# Patient Record
Sex: FEMALE | Race: WHITE | Employment: STUDENT | URBAN - METROPOLITAN AREA
[De-identification: names, ages, dates, MRNs, and addresses within clinical notes are randomized per-mention and may not be internally consistent; named-entity substitution may affect disease eponyms.]

---
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
## 2025-01-14 ENCOUNTER — APPOINTMENT (OUTPATIENT)
Dept: BEHAVIORAL HEALTH | Facility: CLINIC | Age: 20
End: 2025-01-14
Payer: COMMERCIAL

## 2025-01-14 DIAGNOSIS — F39 MOOD DISORDER (CMS-HCC): ICD-10-CM

## 2025-01-14 DIAGNOSIS — F41.9 ANXIETY: ICD-10-CM

## 2025-01-14 DIAGNOSIS — F32.A DEPRESSION, UNSPECIFIED DEPRESSION TYPE: ICD-10-CM

## 2025-01-14 DIAGNOSIS — F90.0 ATTENTION DEFICIT HYPERACTIVITY DISORDER (ADHD), PREDOMINANTLY INATTENTIVE TYPE: ICD-10-CM

## 2025-01-14 DIAGNOSIS — F22 PARANOIA (MULTI): ICD-10-CM

## 2025-01-14 PROCEDURE — 90837 PSYTX W PT 60 MINUTES: CPT

## 2025-01-14 NOTE — PROGRESS NOTES
Therapy Session  Progress Note    Session Type: Virtual    Start Time: 11:00 am  End Time: 11:55 am    Appointment: Scheduled    Reason for Visit: Depression, Anxiety, Mood Disorder, Paranoia    Patient Symptoms/Interval History: Feeling less anxious, started on a new medication which may be helping. Has been intently watching various news clips on [employer] and has fears related to what things she has learned. Identified that she has become more paranoid since the presidential election, has fears that something bad will happen. Has been staying up late at night to watch [employer] videos and admits that doing so often gets her scared and she sometimes has fears she will die. Trying to cut back on her marijuana use since being hospitalized. Plans to start taking classes this semester.    Mental Status: Alert & Oriented x3    Functional Status: No impairment    Interventions Provided: Solution-Focused Therapy, Review Progress/Goals    Progress Made: Moderate    Response to Interventions: The patient was receptive to the interventions provided.    Action Plan/Homework: The patient would like to consider what places she can apply for a job at.    Treatment Plan: Use Solution-Focused Therapy to help the patient manage symptoms of depression, anxiety and paranoia.    Follow Up/Next Appointment: Follow up in 2 weeks.      CHONG Murillo, ANGUS-S    **This visit was performed using real-time telehealth tools, including an audio/video connection between the patient and this provider. The patient provided consent for the individual telemedicine service and understands the limitations of the visit.

## 2025-01-21 ENCOUNTER — SOCIAL WORK (OUTPATIENT)
Dept: BEHAVIORAL HEALTH | Facility: CLINIC | Age: 20
End: 2025-01-21
Payer: COMMERCIAL

## 2025-01-21 DIAGNOSIS — F32.A DEPRESSION, UNSPECIFIED DEPRESSION TYPE: ICD-10-CM

## 2025-01-21 DIAGNOSIS — F39 MOOD DISORDER (CMS-HCC): ICD-10-CM

## 2025-01-21 DIAGNOSIS — F22 PARANOIA (MULTI): ICD-10-CM

## 2025-01-21 DIAGNOSIS — F90.0 ATTENTION DEFICIT HYPERACTIVITY DISORDER (ADHD), PREDOMINANTLY INATTENTIVE TYPE: ICD-10-CM

## 2025-01-21 DIAGNOSIS — F41.9 ANXIETY: ICD-10-CM

## 2025-01-21 PROCEDURE — 90837 PSYTX W PT 60 MINUTES: CPT
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
## 2025-01-31 ENCOUNTER — OFFICE VISIT (OUTPATIENT)
Dept: PRIMARY CARE | Facility: CLINIC | Age: 20
End: 2025-01-31
Payer: COMMERCIAL

## 2025-01-31 ENCOUNTER — APPOINTMENT (OUTPATIENT)
Dept: BEHAVIORAL HEALTH | Facility: CLINIC | Age: 20
End: 2025-01-31
Payer: COMMERCIAL

## 2025-01-31 VITALS
HEART RATE: 65 BPM | WEIGHT: 102 LBS | HEIGHT: 61 IN | BODY MASS INDEX: 19.26 KG/M2 | RESPIRATION RATE: 16 BRPM | SYSTOLIC BLOOD PRESSURE: 80 MMHG | TEMPERATURE: 98.1 F | OXYGEN SATURATION: 99 % | DIASTOLIC BLOOD PRESSURE: 54 MMHG

## 2025-01-31 DIAGNOSIS — Z11.1 SCREENING EXAMINATION FOR PULMONARY TUBERCULOSIS: ICD-10-CM

## 2025-01-31 DIAGNOSIS — F90.0 ATTENTION DEFICIT HYPERACTIVITY DISORDER (ADHD), PREDOMINANTLY INATTENTIVE TYPE: ICD-10-CM

## 2025-01-31 DIAGNOSIS — F22 PARANOIA (MULTI): ICD-10-CM

## 2025-01-31 DIAGNOSIS — F41.9 ANXIETY: ICD-10-CM

## 2025-01-31 DIAGNOSIS — F32.A DEPRESSION, UNSPECIFIED DEPRESSION TYPE: ICD-10-CM

## 2025-01-31 DIAGNOSIS — F39 MOOD DISORDER (CMS-HCC): ICD-10-CM

## 2025-01-31 DIAGNOSIS — Z00.00 ROUTINE GENERAL MEDICAL EXAMINATION AT A HEALTH CARE FACILITY: Primary | ICD-10-CM

## 2025-01-31 DIAGNOSIS — Z23 NEED FOR TUBERCULOSIS VACCINATION: ICD-10-CM

## 2025-01-31 PROCEDURE — 86580 TB INTRADERMAL TEST: CPT | Performed by: FAMILY MEDICINE

## 2025-01-31 PROCEDURE — 90837 PSYTX W PT 60 MINUTES: CPT

## 2025-01-31 PROCEDURE — 3008F BODY MASS INDEX DOCD: CPT | Performed by: FAMILY MEDICINE

## 2025-01-31 PROCEDURE — 99395 PREV VISIT EST AGE 18-39: CPT | Performed by: FAMILY MEDICINE

## 2025-01-31 PROCEDURE — 1036F TOBACCO NON-USER: CPT | Performed by: FAMILY MEDICINE

## 2025-01-31 ASSESSMENT — PATIENT HEALTH QUESTIONNAIRE - PHQ9
7. TROUBLE CONCENTRATING ON THINGS, SUCH AS READING THE NEWSPAPER OR WATCHING TELEVISION: NEARLY EVERY DAY
4. FEELING TIRED OR HAVING LITTLE ENERGY: NEARLY EVERY DAY
8. MOVING OR SPEAKING SO SLOWLY THAT OTHER PEOPLE COULD HAVE NOTICED. OR THE OPPOSITE, BEING SO FIGETY OR RESTLESS THAT YOU HAVE BEEN MOVING AROUND A LOT MORE THAN USUAL: NOT AT ALL
5. POOR APPETITE OR OVEREATING: NOT AT ALL
1. LITTLE INTEREST OR PLEASURE IN DOING THINGS: MORE THAN HALF THE DAYS
2. FEELING DOWN, DEPRESSED OR HOPELESS: MORE THAN HALF THE DAYS
3. TROUBLE FALLING OR STAYING ASLEEP OR SLEEPING TOO MUCH: NEARLY EVERY DAY
9. THOUGHTS THAT YOU WOULD BE BETTER OFF DEAD, OR OF HURTING YOURSELF: NOT AT ALL
SUM OF ALL RESPONSES TO PHQ QUESTIONS 1-9: 14
SUM OF ALL RESPONSES TO PHQ9 QUESTIONS 1 AND 2: 4
6. FEELING BAD ABOUT YOURSELF - OR THAT YOU ARE A FAILURE OR HAVE LET YOURSELF OR YOUR FAMILY DOWN: SEVERAL DAYS
10. IF YOU CHECKED OFF ANY PROBLEMS, HOW DIFFICULT HAVE THESE PROBLEMS MADE IT FOR YOU TO DO YOUR WORK, TAKE CARE OF THINGS AT HOME, OR GET ALONG WITH OTHER PEOPLE: VERY DIFFICULT

## 2025-01-31 NOTE — PROGRESS NOTES
"Subjective   Patient ID: Delfina Ramirez is a 19 y.o. female who presents for Annual Exam.  HPI  Annual physical   Eats a generally healthy diet   Not active   Denies any chest pain,SOB  Urination/BM normal   Last eye apt 10/2023  Last dental apt 2 month   LMP less 1 week ago  Pt did bring paper work for Physical    Pt is going need a two step TB test    No other concern/question     Review of systems  ; Patient seen today for exam denies any problems with headaches or vision, denies any shortness of breath chest pain nausea or vomiting, no black stool no blood in the stool no heartburn type symptoms denies any problems with constipation or diarrhea, and no dysuria-type symptoms    The patient's allergies medications were reviewed with them today    The patient's social family and surgical history or also reviewed here today, along with her past medical history.     Objective     Alert and active in  no acute distress  HEENT TMs clear oropharynx negative nares clear no drainage noted neck supple  With no adenopathy   Heart regular rate and rhythm without murmur and no carotid bruits  Lungs- clear to auscultation bilaterally, no wheeze or rhonchi noted  Thyroid -negative masses or nodularity  Abdomen- soft times four quadrants , bowel sounds positive no masses or organomegaly, negative tenderness guarding or rebound  Neurological exam unremarkable- DTRs in upper and lower extremities within normal limits.   skin -no lesions noted      No scoliosis    BP 80/54 (BP Location: Right arm, Patient Position: Sitting, BP Cuff Size: Small adult)   Pulse 65   Temp 36.7 °C (98.1 °F) (Temporal)   Resp 16   Ht 1.549 m (5' 1\")   Wt 46.3 kg (102 lb)   SpO2 99%   BMI 19.27 kg/m²     Allergies   Allergen Reactions    Dextroamphetamine-Amphetamine Other     Mood changes    Haldol [Haloperidol] Unknown       Assessment/Plan   Problem List Items Addressed This Visit       BMI less than 19,adult    Routine general medical " examination at a health care facility - Primary    Relevant Orders    Varicella Zoster Antibody, IgG    Measles (Rubeola) Antibody, IgG    Mumps Antibody, IgG    Rubella antibody, IgG    T-Spot TB     Other Visit Diagnoses       Need for tuberculosis vaccination        Screening examination for pulmonary tuberculosis        Relevant Orders    TB Skin Test (Completed)          We reviewed immunizations and discussed car and bike safety, and abstaining from alcohol tobacco and illicit drug use, was also discussed safe sex, reminded about good nutrition and exercise with less sweets and soda pop    If anything worsens or changes please call us at once, follow up in the office as planned,

## 2025-01-31 NOTE — PROGRESS NOTES
Therapy Session  Progress Note    Session Type: Virtual    Start Time: 11:00 am  End Time: 11:45 am    Appointment: Scheduled    Reason for Visit: Depression, Anxiety    Patient Symptoms/Interval History: Having social anxiety and anxiety related to driving. Feels isolated and frustrated. Got hired and will be starting a new job. Was supposed to start a job training program but it got cancelled for reasons unknown. Having sleep issues which affect how she feels during the day. Doesn't get along with her step-father and desean by trying to keep her mouth shut when they have a difference of opinion. Has a belief that she is not normal and she wants her life to be different. Has a goal of making friends but has difficulty taking action to leave the house to meet people.     Mental Status: Alert & Oriented x3    Functional Status: No impairment    Interventions Provided: CBT - discussed Behavioral Activation and Exposure Therapy    Progress Made: Moderate    Response to Interventions: The patient was receptive to the interventions provided.    Action Plan/Homework: The patient would like to consider if she wants to continue doing therapy since she doesn't find it helpful. She agreed to consider if this will contribute more to her social isolation.     Treatment Plan: Use CBT to help the patient gain awareness of unhelpful thought patterns and core beliefs. Help the patient learn healthy coping skills to manage depression and anxiety.     Follow Up/Next Appointment: Follow up in 2 weeks.      CHONG Murillo, SONAMS    **This visit was performed using real-time telehealth tools, including an audio/video connection between the patient and this provider. The patient provided consent for the individual telemedicine service and understands the limitations of the visit.

## 2025-02-03 LAB
IGNF NEG CNTRL BLD: NORMAL
M TB IFN-G BLD-IMP: NEGATIVE
MEV IGG SER IA-ACNC: <13.5 AU/ML
MITOGEN IGNF.SPOT COUNT BLD: NORMAL
MUV IGG SER IA-ACNC: 18.9 AU/ML
QUEST PANEL A SPOT COUNT: 0
QUEST PANEL B SPOT COUNT: 0
RUBV IGG SERPL IA-ACNC: 3.61 INDEX
VZV IGG SER IA-ACNC: 2.02 S/CO

## 2025-02-04 ENCOUNTER — TELEPHONE (OUTPATIENT)
Dept: PRIMARY CARE | Facility: CLINIC | Age: 20
End: 2025-02-04
Payer: COMMERCIAL

## 2025-02-04 NOTE — TELEPHONE ENCOUNTER
----- Message from Camilo Chaparro sent at 2/3/2025  6:05 PM EST -----  Reviewed her shots, her TB is negative she has immunity to everything except her rubella titer is decreased decreased, and her measles is not immune,,,, so the only shot she needs is 1 MMR per [place] visit to boost up her immunity levels

## 2025-02-10 ENCOUNTER — APPOINTMENT (OUTPATIENT)
Dept: BEHAVIORAL HEALTH | Facility: CLINIC | Age: 20
End: 2025-02-10
Payer: COMMERCIAL

## 2025-02-13 ENCOUNTER — APPOINTMENT (OUTPATIENT)
Dept: BEHAVIORAL HEALTH | Facility: CLINIC | Age: 20
End: 2025-02-13
Payer: COMMERCIAL

## 2025-02-13 DIAGNOSIS — F41.1 GAD (GENERALIZED ANXIETY DISORDER): ICD-10-CM

## 2025-02-13 DIAGNOSIS — F39 MOOD DISORDER (CMS-HCC): ICD-10-CM

## 2025-02-13 DIAGNOSIS — F33.2 SEVERE EPISODE OF RECURRENT MAJOR DEPRESSIVE DISORDER, WITHOUT PSYCHOTIC FEATURES (MULTI): ICD-10-CM

## 2025-02-13 DIAGNOSIS — F90.0 ATTENTION DEFICIT HYPERACTIVITY DISORDER (ADHD), PREDOMINANTLY INATTENTIVE TYPE: ICD-10-CM

## 2025-02-13 DIAGNOSIS — G47.9 SLEEP DISTURBANCE: ICD-10-CM

## 2025-02-13 DIAGNOSIS — Z91.89 COMPLIANCE WITH MEDICATION REGIMEN: ICD-10-CM

## 2025-02-13 DIAGNOSIS — F22 PARANOIA (MULTI): ICD-10-CM

## 2025-02-13 PROBLEM — E86.0 DEHYDRATION: Status: ACTIVE | Noted: 2024-11-23

## 2025-02-13 PROBLEM — R11.15 CYCLIC VOMITING SYNDROME: Status: ACTIVE | Noted: 2024-11-22

## 2025-02-13 PROBLEM — R10.84 INTRACTABLE GENERALIZED ABDOMINAL PAIN: Status: ACTIVE | Noted: 2024-11-23

## 2025-02-13 PROBLEM — F12.90 MARIJUANA USE: Status: ACTIVE | Noted: 2024-11-23

## 2025-02-13 PROBLEM — R11.2 INTRACTABLE NAUSEA AND VOMITING: Status: ACTIVE | Noted: 2024-11-23

## 2025-02-13 PROBLEM — F90.9 ADHD (ATTENTION DEFICIT HYPERACTIVITY DISORDER): Status: ACTIVE | Noted: 2025-02-13

## 2025-02-13 PROCEDURE — 99214 OFFICE O/P EST MOD 30 MIN: CPT

## 2025-02-13 RX ORDER — 1.1% SODIUM FLUORIDE PRESCRIPTION DENTAL CREAM 5 MG/G
CREAM DENTAL
COMMUNITY
Start: 2024-10-02

## 2025-02-13 RX ORDER — METOCLOPRAMIDE 5 MG/1
TABLET ORAL
COMMUNITY
Start: 2024-11-25

## 2025-02-13 RX ORDER — MIRTAZAPINE 7.5 MG/1
7.5 TABLET, FILM COATED ORAL NIGHTLY
Qty: 30 TABLET | Refills: 1 | Status: SHIPPED | OUTPATIENT
Start: 2025-02-13 | End: 2025-04-14

## 2025-02-13 RX ORDER — HYDROXYZINE HYDROCHLORIDE 25 MG/1
12.5-25 TABLET, FILM COATED ORAL DAILY PRN
Qty: 30 TABLET | Refills: 2 | Status: SHIPPED | OUTPATIENT
Start: 2025-02-13

## 2025-02-13 RX ORDER — KETOROLAC TROMETHAMINE 10 MG/1
10 TABLET, FILM COATED ORAL
COMMUNITY
Start: 2024-11-25

## 2025-02-13 RX ORDER — PROMETHAZINE HYDROCHLORIDE 25 MG/1
TABLET ORAL
COMMUNITY
Start: 2024-11-22

## 2025-02-13 RX ORDER — PALIPERIDONE 3 MG/1
3 TABLET, EXTENDED RELEASE ORAL EVERY MORNING
Qty: 30 TABLET | Refills: 1 | Status: SHIPPED | OUTPATIENT
Start: 2025-02-13 | End: 2025-04-14

## 2025-02-13 RX ORDER — METHYLPHENIDATE HYDROCHLORIDE 18 MG/1
18 TABLET ORAL EVERY MORNING
Qty: 30 TABLET | Refills: 0 | Status: SHIPPED | OUTPATIENT
Start: 2025-02-13 | End: 2025-03-15

## 2025-02-13 ASSESSMENT — ANXIETY QUESTIONNAIRES
5. BEING SO RESTLESS THAT IT IS HARD TO SIT STILL: SEVERAL DAYS
6. BECOMING EASILY ANNOYED OR IRRITABLE: SEVERAL DAYS
7. FEELING AFRAID AS IF SOMETHING AWFUL MIGHT HAPPEN: NOT AT ALL
4. TROUBLE RELAXING: SEVERAL DAYS
1. FEELING NERVOUS, ANXIOUS, OR ON EDGE: MORE THAN HALF THE DAYS
IF YOU CHECKED OFF ANY PROBLEMS ON THIS QUESTIONNAIRE, HOW DIFFICULT HAVE THESE PROBLEMS MADE IT FOR YOU TO DO YOUR WORK, TAKE CARE OF THINGS AT HOME, OR GET ALONG WITH OTHER PEOPLE: SOMEWHAT DIFFICULT
3. WORRYING TOO MUCH ABOUT DIFFERENT THINGS: SEVERAL DAYS
GAD7 TOTAL SCORE: 7
2. NOT BEING ABLE TO STOP OR CONTROL WORRYING: SEVERAL DAYS

## 2025-02-13 NOTE — PROGRESS NOTES
Delfina Ramirez is a 19 y.o. female patient presenting for a virtual FUV  Visit location: patient (Delfina, home), provider (FABRIZIO Borja, virtual office)  mom (Divya) present  Pt identify self by name, , and address     Chief Complaint   Patient presents with    Anxiety    Depression    Sleeping Problem    ADHD    Bipolar    Follow-up    Med Management    Paranoia    Panic Attack     HPI    2025  Reports significant improvement with Invega over the last several weeks.  Per mom, Invega has helped with paranoid delusions, but patient continued to experience easy anger outbursts/irritability for no reason.  Anxiety and depression have remained present but not as impactful.  Also reports mild less frequent/intense episodes of panic attacks.  Sleep however, has remained an issue, often goes to sleep at 3 AM the morning, but noting that was an improvement from having to go to sleep at 5 AM in the morning.  Appetite has been low.  Denies any noticeable side effects from medication.  Denies substance use.  Denies significant mood swings.  Continue to struggle profoundly with attention/focus/concentration.  Denies SI/hallucinations/delusions/akosua/hypomania.  Overall reports much improvement but not optimal.    Current S/Sx:  -Mood: Occasional mood swings, easy anger outburst/irritability   -Depressive mood: PHQ-9 (2)  -Fatigue/Energy: Moderate  -Feeling hope/help/worthless: denies  -Sleep: Significant difficulties falling asleep, often go to sleep between 3 and 5 AM the morning  -Motivation: average  -Appetite/Weight Changes: Poor appetite, no weight changes  -Psychosis: denies hallucinations/delusions  -SI/HI: Denies current suicidal intent/plan  -Guns/Weapons at home: denies     -Anxiety: Intermittent bouts of anxiety reported  -NADEEN-7 (7)     Panic attacks  denies     HISTORY  PSYCH HISTORY  -Psych Hx: ADHD, depression, anxiety, and mood disorder  -Psych Hospitalization Hx: Voluntary  "admission into [facility] last month. \"Called the  at 0200 to seek help because I was freaking out and they took me to the hospital.\" Was monitored for 24 hours then released unmedicated.   -Suicide Attempt Hx: denies  -Self-Harm/Violence Hx: Freshman, Sophomore, high school, but it's been a while since last cut  -Current psych meds: Concerta 18 mg daily, Hydroxyzine 25 mg daily as needed for anxiety, Valium 2 mg every 8 hours as needed for muscle spasms (given at the ER-mom stores it in her room).   -Psych Med Hx: Buspirone, Abilify 2 mg, Lithium 300 mg, several ADHD meds, on antidepressants (Prozac) - can't remember the names     SUBSTANCE USE HISTORY  -Substance Use Hx: uses marijuana every night to help sleep and anxiety  -ETOH: denies  -Tobacco: nicotine  -Caffeine: sometimes  -Substance Abuse Treatment Hx: denies     FAMILY HISTORY  -Family Psych Hx: maternal side: bipolar d/o, paternal side: depression   -Family Suicide Hx: denies  -Family Substance Abuse Hx: unsure     SOCIAL HISTORY  -Upbringing: Grew up with both parents in different households. Has 1 sister, 2 step sisters  -Income: denies financial struggle  -Safety: feels safe at home/generally  -Support system: average  -Trauma: emotional  -Education: GED  -Work: [employer]  -Marital Status: single  -Children: denies  -Living situation: Lives with mom and her boyfriend  -: denies  -Legal: denies      MEDICAL HISTORY  -PCP: Camilo Chaparro DO  -TBI/head trauma/LOC/seizure hx: denies     REVIEW OF SYSTEMS  Review of Systems   All other systems reviewed and are negative.       PHYSICAL EXAM  Physical Exam  Psychiatric:         Attention and Perception: Attention and perception normal.         Mood and Affect: Mood and affect normal.         Speech: Speech normal.         Behavior: Behavior normal. Behavior is cooperative.         Thought Content: Thought content normal.         Cognition and Memory: Cognition and memory normal.         " Judgment: Judgment normal.          IMPRESSION  Anxiety, Depression, Sleeping Problem, ADHD, Bipolar, Follow-up, Med Management, Paranoia, and Panic Attack    Delfina reports improved paranoid delusions, but easy anger outburst/irritability remain present, less impactful.  Reports bouts of anxiety, but depressive symptoms have not been as prominent.  Reports mild less intense/frequent panic attacks mostly relating to driving, finds hydroxyzine helpful.  Reports mild intermittent mood swings.  Reports ongoing difficulty with sleep an appetite.   No hallucinations/delusion/akosua/hypomania/SI reported.  No side effects or substance use, and has not had to use cannabis since last visit.  Discussed to increase Invega to further improve paranoid delusions and mood swings, add mirtazapine to help with appetite, sleep, and depression, continue to hold Wellbutrin, and resume Concerta a week from today to help with attention/focus/concentration.  Will consider Invega BENNETT to continue to stabilize mood symptoms and psychosis.  Follow-up in 4 weeks    SI/HI ASSESSMENT  -Risk Assessment: Delfina Ramirez is currently a low acute risk of suicide and self-harm due to no past suicide attempt(s) and not currently endorsing thoughts of suicide.   -Suicidal Risk Factors:  and unmarried/single  -Protective Factors: strong coping skills, positive family relationships, and employment  -Plan to Reduce Risk: increase coping skills .     PLAN  Reviewed diagnostic impression including subjective and objective data and provided education about Anxiety, MDD, bipolar disorder, ADHD, and Sleep disturbance, etiology, treatment recommendations including medication, therapy, course of treatment and prognosis. Patient amenable to treatment plan.      Dx: Anxiety, Depression, Sleeping Problem, ADHD, Bipolar, Follow-up, Med Management, Paranoia, and Panic Attack  RESUME Concerta 18 mg daily start 2/20/25  HOLD Wellbutrin  mg  daily  INCREASE Invega 3 mg daily  START Mirtazapine 7.5 mg daily       Reviewed r/b/a, possible side effects of the medication. Client is aware about the benefit outweighs the risk.     Psychotherapy: none at the moment    Labs reviewed     OARRS  I have personally reviewed the OARRS report for Delfina Ramirez. I have considered the risks of abuse, dependence, addiction and diversion. Last filled Concerta 18 mg on 08/28/2024 (30 tabs x 30 days)    Last Urine Drug Screen  Date of Last Screen: 08/21/2024, +ve cannabinoids, UDS reordered    Controlled Substance Agreement:  Date of the Last Agreement: 09/10/2024  Reviewed Controlled Substance Agreement including but not limited to the benefits, risks, and alternatives to treatment with a Controlled Substance medication(s).      -Follow-up with this provider in 4 weeks.    -Total time: 30 minutes via virtual    - Follow up with physical health providers as scheduled  -Risks/benefits/assessment of medication interventions discussed with pt; pt agreeable to plan. Will continue to monitor for symptoms mgmt and SEs and adjust plan as needed.  -MI to increase coping skills/behavior regulation.  -Safety plan reviewed.  -Call  Psychiatry at [phone] with issues.  -For [facility] residents, Mobile Crisis is a 24/7 hotline you can call for assistance at [phone]. Please call 131 or go to your closest Emergency Room if you feel worse. This includes thoughts of hurting yourself or anyone else, or having other troubles such as hearing voices, seeing visions, or having new and scary thoughts about the people around you.

## 2025-02-18 ENCOUNTER — APPOINTMENT (OUTPATIENT)
Dept: BEHAVIORAL HEALTH | Facility: CLINIC | Age: 20
End: 2025-02-18
Payer: COMMERCIAL

## 2025-02-20 DIAGNOSIS — F39 MOOD DISORDER (CMS-HCC): ICD-10-CM

## 2025-02-20 DIAGNOSIS — F22 PARANOIA (MULTI): ICD-10-CM

## 2025-02-20 DIAGNOSIS — F31.9 BIPOLAR AFFECTIVE DISORDER, REMISSION STATUS UNSPECIFIED (MULTI): ICD-10-CM

## 2025-02-20 RX ORDER — PALIPERIDONE 1.5 MG/1
TABLET, EXTENDED RELEASE ORAL
Qty: 60 TABLET | Refills: 1 | Status: SHIPPED | OUTPATIENT
Start: 2025-02-20

## 2025-02-21 NOTE — PROGRESS NOTES
"Reports tolerating Invega 1.5 mg, but has difficulty with 3 mg.  Patient and mom made aware that Invega is traditionally not meant to be split because it is an ER medication, but due to patient's specific circumstances, will administer 1.5 tabs (2.25 mg) x 1 week, then attempt to increase dose again to 3 mg daily.  Please take dose for 1 to 2 weeks before attending to add mirtazapine to regimen.  \"Consider for right now we will consider during next visit.  "

## 2025-03-10 ENCOUNTER — APPOINTMENT (OUTPATIENT)
Dept: BEHAVIORAL HEALTH | Facility: CLINIC | Age: 20
End: 2025-03-10
Payer: COMMERCIAL

## 2025-03-10 VITALS
SYSTOLIC BLOOD PRESSURE: 100 MMHG | BODY MASS INDEX: 20.52 KG/M2 | WEIGHT: 108.6 LBS | DIASTOLIC BLOOD PRESSURE: 59 MMHG | TEMPERATURE: 99.2 F | HEART RATE: 77 BPM

## 2025-03-10 DIAGNOSIS — F90.0 ATTENTION DEFICIT HYPERACTIVITY DISORDER (ADHD), PREDOMINANTLY INATTENTIVE TYPE: ICD-10-CM

## 2025-03-10 DIAGNOSIS — F31.9 BIPOLAR AFFECTIVE DISORDER, REMISSION STATUS UNSPECIFIED (MULTI): ICD-10-CM

## 2025-03-10 DIAGNOSIS — F22 PARANOIA (MULTI): ICD-10-CM

## 2025-03-10 DIAGNOSIS — F39 MOOD DISORDER (CMS-HCC): ICD-10-CM

## 2025-03-10 DIAGNOSIS — F41.1 GAD (GENERALIZED ANXIETY DISORDER): ICD-10-CM

## 2025-03-10 DIAGNOSIS — F33.2 SEVERE EPISODE OF RECURRENT MAJOR DEPRESSIVE DISORDER, WITHOUT PSYCHOTIC FEATURES (MULTI): ICD-10-CM

## 2025-03-10 DIAGNOSIS — Z91.89 COMPLIANCE WITH MEDICATION REGIMEN: ICD-10-CM

## 2025-03-10 DIAGNOSIS — G47.9 SLEEP DISTURBANCE: ICD-10-CM

## 2025-03-10 PROCEDURE — 99214 OFFICE O/P EST MOD 30 MIN: CPT

## 2025-03-10 RX ORDER — MIRTAZAPINE 15 MG/1
15 TABLET, FILM COATED ORAL NIGHTLY
Qty: 30 TABLET | Refills: 1 | Status: SHIPPED | OUTPATIENT
Start: 2025-03-10 | End: 2025-05-09

## 2025-03-10 RX ORDER — PALIPERIDONE 1.5 MG/1
TABLET, EXTENDED RELEASE ORAL
Qty: 60 TABLET | Refills: 1 | Status: SHIPPED | OUTPATIENT
Start: 2025-03-10

## 2025-03-10 ASSESSMENT — ANXIETY QUESTIONNAIRES
4. TROUBLE RELAXING: SEVERAL DAYS
7. FEELING AFRAID AS IF SOMETHING AWFUL MIGHT HAPPEN: NOT AT ALL
3. WORRYING TOO MUCH ABOUT DIFFERENT THINGS: SEVERAL DAYS
IF YOU CHECKED OFF ANY PROBLEMS ON THIS QUESTIONNAIRE, HOW DIFFICULT HAVE THESE PROBLEMS MADE IT FOR YOU TO DO YOUR WORK, TAKE CARE OF THINGS AT HOME, OR GET ALONG WITH OTHER PEOPLE: SOMEWHAT DIFFICULT
6. BECOMING EASILY ANNOYED OR IRRITABLE: NOT AT ALL
5. BEING SO RESTLESS THAT IT IS HARD TO SIT STILL: NOT AT ALL
2. NOT BEING ABLE TO STOP OR CONTROL WORRYING: SEVERAL DAYS
1. FEELING NERVOUS, ANXIOUS, OR ON EDGE: SEVERAL DAYS
GAD7 TOTAL SCORE: 4

## 2025-03-10 NOTE — PROGRESS NOTES
Delfina Ramirez is a 19 y.o. female patient presenting for aN in person FUV  Visit location: patient (Delfina, home), provider (FABRIZIO Borja, virtual office)  mom (Divya) present  Pt identify self by name, , and address     Chief Complaint   Patient presents with    Bipolar    Schizoaffective Disorder    Anxiety    Depression    Sleeping Problem    Panic Attack    Follow-up    Med Management    ADHD     [facility]    3/10/2025  Reports mostly manage mood symptoms and paranoia on current dose of Invega, but symptoms are not optimal.  Reports mild easy anger outburst/irritability but they are not as debilitating as before.  Reports feeling mostly functional on current regimen.  Continue to take mirtazapine as before, appetite is slowly improving, able to get to sleep an hour earlier than before, but still going to bed around 1 AM in the morning instead of 2 AM, also reports some improvement with depressive symptoms.  Denies excessive/debilitating worrying, reports movement of apprehension/feeling overwhelmed, but denies panic attacks.  Mood swings have mostly been managed.  Denies any noticeable side effects from medication.  Denies substance use including cannabis.  Reports poor attention/focus/concentration, hoping to start Concerta soon.  Do not increase Invega as previously discussed from 1.5 to 3 mg because of the anxiety may cause inadvertent adverse effects.  Denies SI/hallucinations/delusions/akosua/hypomania.    Current S/Sx:  -Mood: Occasional mood swings, easy anger outburst/irritability remains present  -Depressive mood: PHQ-9 (2)  -Fatigue/Energy: Moderate  -Feeling hope/help/worthless: denies  -Sleep: Significant difficulties remain present, but improving with current dose of mirtazapine  -Motivation: average  -Appetite/Weight Changes: Improved appetite, no weight changes  -Psychosis: denies hallucinations/delusions  -SI/HI: Denies current suicidal intent/plan  -Guns/Weapons at home:  "denies     -Anxiety: Reports intermittent moments of feeling overwhelmed/apprehensive  -NADEEN-7 (4)     Panic attacks  denies     HISTORY  PSYCH HISTORY  -Psych Hx: ADHD, depression, anxiety, and mood disorder  -Psych Hospitalization Hx: Voluntary admission into [facility] last month. \"Called the  at 0200 to seek help because I was freaking out and they took me to the hospital.\" Was monitored for 24 hours then released unmedicated.   -Suicide Attempt Hx: denies  -Self-Harm/Violence Hx: Freshman, Sophomore, high school, but it's been a while since last cut  -Current psych meds: Concerta 18 mg daily, Hydroxyzine 25 mg daily as needed for anxiety, Valium 2 mg every 8 hours as needed for muscle spasms (given at the ER-mom stores it in her room).   -Psych Med Hx: Buspirone, Abilify 2 mg, Lithium 300 mg, several ADHD meds, on antidepressants (Prozac) - can't remember the names     SUBSTANCE USE HISTORY  -Substance Use Hx: uses marijuana every night to help sleep and anxiety  -ETOH: denies  -Tobacco: nicotine  -Caffeine: sometimes  -Substance Abuse Treatment Hx: denies     FAMILY HISTORY  -Family Psych Hx: maternal side: bipolar d/o, paternal side: depression   -Family Suicide Hx: denies  -Family Substance Abuse Hx: unsure     SOCIAL HISTORY  -Upbringing: Grew up with both parents in different households. Has 1 sister, 2 step sisters  -Income: denies financial struggle  -Safety: feels safe at home/generally  -Support system: average  -Trauma: emotional  -Education: GED  -Work: [employer]  -Marital Status: single  -Children: denies  -Living situation: Lives with mom and her boyfriend  -: denies  -Legal: denies      MEDICAL HISTORY  -PCP: Camilo Chaparro, DO  -TBI/head trauma/LOC/seizure hx: denies     REVIEW OF SYSTEMS  Review of Systems   All other systems reviewed and are negative.       PHYSICAL EXAM  Physical Exam  Psychiatric:         Attention and Perception: Attention and perception normal.         Mood and " Affect: Mood and affect normal.         Speech: Speech normal.         Behavior: Behavior normal. Behavior is cooperative.         Thought Content: Thought content normal.         Cognition and Memory: Cognition and memory normal.         Judgment: Judgment normal.          IMPRESSION  FUV today via in person. Patient mostly manage paranoia/delusions/mood symptoms but symptoms are not optimal.  Denies debilitating mood swings/akosua/hypomania.  Continue to struggle with mild symptoms of easy anger outburst/irritability.  Also struggles with moments of apprehension/feeling overwhelmed, but denies panic attacks.  Denies debilitating depression/anxiety.  Sleep disturbance is slowly improving on current dose of mirtazapine.  Appetite is also improving.  Denies SI/hallucinations/delusions/akosua/hypomania.  Denies any noticeable side effects from medication.  Did not increase Invega dose as previously discussed, continue to take 1.5 mg.  Denies substance use including cannabis.  Reports ongoing difficulty with attention/focus/concentration.  Discussed to continue current dose of Invega, increase mirtazapine 15 mg to effectively manage sleep and appetite and depression, resume Concerta as previously discussed for attention/focus/concentration.  CSA active, reordered UDS given previous positive cannabis and urine screen, will resume Concerta following expected results.  Will follow up with this provider in 4 weeks to continue monitoring, or sooner if needed.      Plan:     1. Reviewed diagnostic impression including subjective and objective data and provided education about Anxiety, MDD, bipolar disorder, ADHD, and Sleep disturbance, paranoia, etiology, treatment recommendations including medication, therapy, course of treatment and prognosis. Patient amenable to treatment plan.     2. Safety Assessment: History of no thoughts, but denies current thoughts of SI, plan/intent.  No h/o SA. RF include  and  unmarried/single. PF include strong coping skills, positive family relationships, and employment, engaged in care, future oriented, no guns.  Low imminent risk     3. @  Problem List Items Addressed This Visit       Depression    Relevant Medications    mirtazapine (Remeron) 15 mg tablet    Mood disorder (CMS-HCC)    Relevant Medications    paliperidone (Invega) 1.5 mg 24 hr tablet    ADHD (attention deficit hyperactivity disorder)    Relevant Orders    Drug Screen, Urine With Reflex to Confirmation     Other Visit Diagnoses       NADEEN (generalized anxiety disorder)        Paranoia (Multi)        Relevant Medications    paliperidone (Invega) 1.5 mg 24 hr tablet    Compliance with medication regimen        Relevant Orders    Drug Screen, Urine With Reflex to Confirmation    Bipolar affective disorder, remission status unspecified (Multi)        Relevant Medications    paliperidone (Invega) 1.5 mg 24 hr tablet    Sleep disturbance        Relevant Medications    mirtazapine (Remeron) 15 mg tablet           RESUME Concerta 18 mg daily start 2/20/25  CONTINUE Invega 1.5 mg daily  INCREASE Mirtazapine 15 mg daily  CONTINUE hydroxyzine 25 mg, take 0.5-1 tabs daily as needed for severe anxiety/panic attacks     Reviewed r/b/a, possible side effects of the medication. Client is aware about the benefit outweighs the risk.     4. INDIVIDUAL THERAPY:  none at the moment     5. OARRS: I have personally reviewed the OARRS report for Delfina Ramirez. I have considered the risks of abuse, dependence, addiction and diversion. Last filled Concerta 18 mg on 10/28/2024  (30 tabs x 30 days)     6. Labs reviewed    7. Last Urine Drug Screen: Date of Last Screen: 08/21/2024, +ve cannabinoids, UDS reordered    8. Controlled Substance Agreement: Date of the Last Agreement: 09/10/2024  Reviewed Controlled Substance Agreement including but not limited to the benefits, risks, and alternatives to treatment with a Controlled Substance  medication(s).    9. Follow-up with this provider in 4 weeks.    10. -Total time: 22 minutes via in person     - Follow up with physical health providers as scheduled  - May follow up sooner if experiences worsening symptoms by calling  Psychiatry at [phone]  - Patient verbalized an understanding to call [facility] at [phone] ([facility]), 211, or 949/go to the nearest emergency room if experiences thoughts of harm to self or others.

## 2025-03-13 ENCOUNTER — APPOINTMENT (OUTPATIENT)
Dept: BEHAVIORAL HEALTH | Facility: CLINIC | Age: 20
End: 2025-03-13
Payer: COMMERCIAL

## 2025-03-13 NOTE — PROGRESS NOTES
"Subjective   Patient ID: Delfina Ramirez is a 19 y.o. female who presents for Earache.  HPI    Patient presents in office for ear pain. Located in both ears. Mild in severity. Ongoing x about a month. Patient states when she gets cold chills, her ears often hurt, feels like a mild aching pain. Pain 2/10. Denies any runny nose. Does not take any allergy medications.    She requested prescription for MMR vaccine.    She works at a dog shop.       Review of systems  ; Patient seen today for exam denies any problems with headaches or vision, denies any shortness of breath chest pain nausea or vomiting, no black stool no blood in the stool no heartburn type symptoms denies any problems with constipation or diarrhea, and no dysuria-type symptoms    The patient's allergies medications were reviewed with them today    The patient's social family and surgical history or also reviewed here today, along with her past medical history.     Objective     Alert and active in  no acute distress  HEENT TMs positive fluid oropharynx negative nares clear no drainage noted neck supple  With no adenopathy   Heart regular rate and rhythm without murmur and no carotid bruits  Lungs- clear to auscultation bilaterally, no wheeze or rhonchi noted  Thyroid -negative masses or nodularity  Abdomen- soft times four quadrants , bowel sounds positive no masses or organomegaly, negative tenderness guarding or rebound        BP 90/60 (BP Location: Right arm, Patient Position: Sitting, BP Cuff Size: Adult)   Pulse 77   Temp 36.6 °C (97.8 °F) (Temporal)   Ht 1.549 m (5' 1\")   SpO2 97%   BMI 20.52 kg/m²     Allergies   Allergen Reactions    Dextroamphetamine-Amphetamine Other     Mood changes    Haldol [Haloperidol] Unknown       Assessment/Plan   Problem List Items Addressed This Visit    None  Visit Diagnoses       Acute ear pain, bilateral    -  Primary    Relevant Medications    fluticasone (Flonase) 50 mcg/actuation nasal spray    montelukast " (Singulair) 10 mg tablet    BMI 20.0-20.9, adult        Bilateral acute serous otitis media, recurrence not specified        Relevant Medications    fluticasone (Flonase) 50 mcg/actuation nasal spray    montelukast (Singulair) 10 mg tablet    Need for measles-mumps-rubella (MMR) vaccine        Relevant Orders    MMR vaccine, subcutaneous (MMR II) (Completed)            Prescribed Singulair 10 mg today for her allergy issues.  Prescribed Flonase nasal spray today for decongestion.    We reviewed immunizations.  Prescribed for MMR II vaccine was sent to drug store.    If anything worsens or changes please call us at once, follow up in the office as planned,       Scribe Attestation  By signing my name below, I, Emily Fermin, Scrjay   attest that this documentation has been prepared under the direction and in the presence of Camilo Chaparro DO.

## 2025-03-14 ENCOUNTER — OFFICE VISIT (OUTPATIENT)
Dept: PRIMARY CARE | Facility: CLINIC | Age: 20
End: 2025-03-14
Payer: COMMERCIAL

## 2025-03-14 VITALS
BODY MASS INDEX: 20.52 KG/M2 | OXYGEN SATURATION: 97 % | TEMPERATURE: 97.8 F | SYSTOLIC BLOOD PRESSURE: 90 MMHG | DIASTOLIC BLOOD PRESSURE: 60 MMHG | HEIGHT: 61 IN | HEART RATE: 77 BPM

## 2025-03-14 DIAGNOSIS — H92.03 ACUTE EAR PAIN, BILATERAL: Primary | ICD-10-CM

## 2025-03-14 DIAGNOSIS — H65.03 BILATERAL ACUTE SEROUS OTITIS MEDIA, RECURRENCE NOT SPECIFIED: ICD-10-CM

## 2025-03-14 DIAGNOSIS — Z23 NEED FOR MEASLES-MUMPS-RUBELLA (MMR) VACCINE: ICD-10-CM

## 2025-03-14 PROCEDURE — 99213 OFFICE O/P EST LOW 20 MIN: CPT | Performed by: FAMILY MEDICINE

## 2025-03-14 PROCEDURE — 90707 MMR VACCINE SC: CPT | Performed by: FAMILY MEDICINE

## 2025-03-14 PROCEDURE — 90471 IMMUNIZATION ADMIN: CPT | Performed by: FAMILY MEDICINE

## 2025-03-14 PROCEDURE — 1036F TOBACCO NON-USER: CPT | Performed by: FAMILY MEDICINE

## 2025-03-14 RX ORDER — MONTELUKAST SODIUM 10 MG/1
10 TABLET ORAL NIGHTLY
Qty: 30 TABLET | Refills: 5 | Status: SHIPPED | OUTPATIENT
Start: 2025-03-14 | End: 2025-09-10

## 2025-03-14 RX ORDER — FLUTICASONE PROPIONATE 50 MCG
2 SPRAY, SUSPENSION (ML) NASAL DAILY
Qty: 16 G | Refills: 11 | Status: SHIPPED | OUTPATIENT
Start: 2025-03-14 | End: 2026-03-14

## 2025-03-14 ASSESSMENT — PATIENT HEALTH QUESTIONNAIRE - PHQ9
2. FEELING DOWN, DEPRESSED OR HOPELESS: NOT AT ALL
SUM OF ALL RESPONSES TO PHQ9 QUESTIONS 1 AND 2: 0
2. FEELING DOWN, DEPRESSED OR HOPELESS: SEVERAL DAYS
1. LITTLE INTEREST OR PLEASURE IN DOING THINGS: SEVERAL DAYS
10. IF YOU CHECKED OFF ANY PROBLEMS, HOW DIFFICULT HAVE THESE PROBLEMS MADE IT FOR YOU TO DO YOUR WORK, TAKE CARE OF THINGS AT HOME, OR GET ALONG WITH OTHER PEOPLE: NOT DIFFICULT AT ALL
1. LITTLE INTEREST OR PLEASURE IN DOING THINGS: NOT AT ALL
SUM OF ALL RESPONSES TO PHQ9 QUESTIONS 1 AND 2: 2

## 2025-03-25 ENCOUNTER — SOCIAL WORK (OUTPATIENT)
Dept: BEHAVIORAL HEALTH | Facility: CLINIC | Age: 20
End: 2025-03-25
Payer: COMMERCIAL

## 2025-03-25 DIAGNOSIS — F41.1 GAD (GENERALIZED ANXIETY DISORDER): ICD-10-CM

## 2025-03-25 DIAGNOSIS — F90.0 ATTENTION DEFICIT HYPERACTIVITY DISORDER (ADHD), PREDOMINANTLY INATTENTIVE TYPE: ICD-10-CM

## 2025-03-25 DIAGNOSIS — F39 MOOD DISORDER (CMS-HCC): ICD-10-CM

## 2025-03-25 DIAGNOSIS — F22 PARANOIA (MULTI): ICD-10-CM

## 2025-03-25 PROCEDURE — 90837 PSYTX W PT 60 MINUTES: CPT

## 2025-03-25 NOTE — PROGRESS NOTES
"Therapy Session  Progress Note    Session Type: In Office    Start Time: 2:00 pm  End Time: 2:55 pm    Appointment: Scheduled    Reason for Visit: Anxiety, Bipolar Disorder, ADHD    Patient Symptoms/Interval History: Mood has been more stable, finding the medications to be helpful. Went to a concert in [place] with her mother which she enjoyed. Admits to having \"freak outs\" when things don't go her way. Doesn't always feel like she has control over these episodes, and is rigid in her beliefs about how things should be. Started a new job that she enjoys but has certain concerns since she has to work alone during her shift. Feels anxiety about talking with her boss about her concerns but is willing to try. The patient is aware that this therapist will be leaving and that this is the final session with this therapist.     Mental Status: Alert & Oriented x3    Functional Status: No impairment    Interventions Provided: Solution-Focused Therapy, Communication Training, Referrals    Progress Made: Moderate    Response to Interventions: The patient was receptive to the interventions provided.    Action Plan/Homework: This is the final session with this therapist. The patient was open to having a referral made to the Patient Navigator to help her get linked with a new therapist.    Treatment Plan: N/A    Follow Up/Next Appointment: N/A      CHONG Murillo, BHARGAVI    "

## 2025-04-07 ENCOUNTER — APPOINTMENT (OUTPATIENT)
Dept: BEHAVIORAL HEALTH | Facility: CLINIC | Age: 20
End: 2025-04-07
Payer: COMMERCIAL

## 2025-04-07 DIAGNOSIS — F39 MOOD DISORDER (CMS-HCC): ICD-10-CM

## 2025-04-07 DIAGNOSIS — G47.9 SLEEP DISTURBANCE: ICD-10-CM

## 2025-04-07 DIAGNOSIS — F31.9 BIPOLAR AFFECTIVE DISORDER, REMISSION STATUS UNSPECIFIED (MULTI): ICD-10-CM

## 2025-04-07 DIAGNOSIS — F33.2 SEVERE EPISODE OF RECURRENT MAJOR DEPRESSIVE DISORDER, WITHOUT PSYCHOTIC FEATURES (MULTI): ICD-10-CM

## 2025-04-07 DIAGNOSIS — F22 PARANOIA (MULTI): ICD-10-CM

## 2025-04-07 PROCEDURE — 99214 OFFICE O/P EST MOD 30 MIN: CPT

## 2025-04-07 RX ORDER — PALIPERIDONE 1.5 MG/1
3 TABLET, EXTENDED RELEASE ORAL EVERY MORNING
Qty: 60 TABLET | Refills: 1 | Status: SHIPPED | OUTPATIENT
Start: 2025-04-07

## 2025-04-07 NOTE — PROGRESS NOTES
"Delfina Ramirez is a 20 y.o. female patient presenting for a(an) virtual FUV  Visit location: patient (Delfina Ramirez, home), [facility]  Pt identify self by name, , and address    Chief Complaint   Patient presents with    mood disorder    Bipolar    Depression    ADHD    Paranoia    Follow-up    Memory Loss     HPI    2025  Reports significant improvement with anger outburst/irritability on current dose of Invega 1.5 mg daily.  States \"after eating a lot nonstop, mom took me off the sleep medication.  I continue to eat nonstop and go to food quickly.  Sleep between midnight and 10 AM the morning.\"  Uses hydroxyzine rarely, denies panic attacks or mood swings.  States mom would like to try 3 mg of Invega for paranoia and anger outburst/irritability.  Other than increased appetite, denies oral side effects from medication.  Denies substance use.  Did not complete UDS as previously ordered, as result, has not started methylphenidate 27 mg daily.  Denies SI/hallucinations/delusions/akosua/hypomania.    Current S/Sx:  -Mood: Occasional mood swings, easy anger outburst/irritability remains present  -Depressive mood: PHQ-9 (2)  -Fatigue/Energy: Moderate  -Feeling hope/help/worthless: denies  -Sleep: Significant difficulties remain present, but improving with current dose of mirtazapine  -Motivation: average  -Appetite/Weight Changes: Improved appetite, no weight changes  -Psychosis: denies hallucinations/delusions  -SI/HI: Denies current suicidal intent/plan  -Guns/Weapons at home: denies     -Anxiety: Reports intermittent moments of feeling overwhelmed/apprehensive  -NADEEN-7 (4)     Panic attacks  denies     HISTORY  PSYCH HISTORY  -Psych Hx: ADHD, depression, anxiety, and mood disorder  -Psych Hospitalization Hx: Voluntary admission into [facility] last month. \"Called the  at 0200 to seek help because I was freaking out and they took me to the hospital.\" Was " monitored for 24 hours then released unmedicated.   -Suicide Attempt Hx: denies  -Self-Harm/Violence Hx: Freshman, Sophomore, high school, but it's been a while since last cut  -Current psych meds: Concerta 18 mg daily, Hydroxyzine 25 mg daily as needed for anxiety, Valium 2 mg every 8 hours as needed for muscle spasms (given at the ER-mom stores it in her room).   -Psych Med Hx: Buspirone, Abilify 2 mg, Lithium 300 mg, several ADHD meds, on antidepressants (Prozac) - can't remember the names     SUBSTANCE USE HISTORY  -Substance Use Hx: uses marijuana every night to help sleep and anxiety  -ETOH: denies  -Tobacco: nicotine  -Caffeine: sometimes  -Substance Abuse Treatment Hx: denies     FAMILY HISTORY  -Family Psych Hx: maternal side: bipolar d/o, paternal side: depression   -Family Suicide Hx: denies  -Family Substance Abuse Hx: unsure     SOCIAL HISTORY  -Upbringing: Grew up with both parents in different households. Has 1 sister, 2 step sisters  -Income: denies financial struggle  -Safety: feels safe at home/generally  -Support system: average  -Trauma: emotional  -Education: GED  -Work: [employer]  -Marital Status: single  -Children: denies  -Living situation: Lives with mom and her boyfriend  -: denies  -Legal: denies      MEDICAL HISTORY  -PCP: Camilo Chaparro DO  -TBI/head trauma/LOC/seizure hx: denies     REVIEW OF SYSTEMS  Review of Systems   All other systems reviewed and are negative.       PHYSICAL EXAM  Physical Exam  Psychiatric:         Attention and Perception: Attention and perception normal.         Mood and Affect: Mood and affect normal.         Speech: Speech normal.         Behavior: Behavior normal. Behavior is cooperative.         Thought Content: Thought content normal.         Cognition and Memory: Cognition and memory normal.         Judgment: Judgment normal.          IMPRESSION  FUV today via in person. Patient reports managed paranoia/delusions/mood symptoms but symptoms are  not optimal, as a result, mom would like to try increasing Invega to 3 mg/day.  Patient notes that mom held mirtazapine 15 mg at bedtime due to significant increase in appetite and she seems to be sleeping about 10 hours a day without the medication.  Has not needed hydroxyzine due to no panic attacks or severe anxiety.   Denies debilitating depression/anxiety.   Denies SI/hallucinations/delusions/akosua/hypomania.  Denies any noticeable side effects from medication.   Denies substance use including cannabis.  Reports ongoing difficulty with attention/focus/concentration, still to complete UDS for resumption of methylphenidate 18 mg daily.  Discussed to continue current dose of Invega, hold mirtazapine 15 mg, resume Concerta as previously discussed for attention/focus/concentration pending UDS.  Will follow up with this provider in 5 weeks to continue monitoring, or sooner if needed.      Plan:     1. Reviewed diagnostic impression including subjective and objective data and provided education about Anxiety, MDD, bipolar disorder, ADHD, and Sleep disturbance, paranoia, etiology, treatment recommendations including medication, therapy, course of treatment and prognosis. Patient amenable to treatment plan.     2. Safety Assessment: History of no thoughts, but denies current thoughts of SI, plan/intent.  No h/o SA. RF include  and unmarried/single. PF include strong coping skills, positive family relationships, and employment, engaged in care, future oriented, no guns.  Low imminent risk     3. @  Problem List Items Addressed This Visit       Depression    Mood disorder (CMS-HCC)    Relevant Medications    paliperidone (Invega) 1.5 mg 24 hr tablet     Other Visit Diagnoses       Sleep disturbance        Paranoia (Multi)        Relevant Medications    paliperidone (Invega) 1.5 mg 24 hr tablet    Bipolar affective disorder, remission status unspecified (Multi)        Relevant Medications    paliperidone (Invega)  1.5 mg 24 hr tablet             RESUME Concerta 18 mg daily (Pending UDS)  INCREASE Invega 1.5 mg, take 2 tabs (3 mg) daily  HOLD Mirtazapine 15 mg daily  CONTINUE hydroxyzine 25 mg, take 0.5-1 tabs daily as needed for severe anxiety/panic attacks     Reviewed r/b/a, possible side effects of the medication. Client is aware about the benefit outweighs the risk.     4. INDIVIDUAL THERAPY:  none at the moment     5. OARRS: I have personally reviewed the OARRS report for Delfina Ramirez. I have considered the risks of abuse, dependence, addiction and diversion. Last filled Concerta 18 mg on 10/28/2024  (30 tabs x 30 days)     6. Labs reviewed    7. Last Urine Drug Screen: Date of Last Screen: 08/21/2024, +ve cannabinoids, UDS reordered    8. Controlled Substance Agreement: Date of the Last Agreement: 09/10/2024  Reviewed Controlled Substance Agreement including but not limited to the benefits, risks, and alternatives to treatment with a Controlled Substance medication(s).    9. Follow-up with this provider in 5 weeks.    10. -Total time: 21 minutes via in person     - Follow up with physical health providers as scheduled  - May follow up sooner if experiences worsening symptoms by calling  Psychiatry at [phone]  - Patient verbalized an understanding to call [facility] at [phone] ([facility]), 211, or 641/go to the nearest emergency room if experiences thoughts of harm to self or others.

## 2025-04-21 ENCOUNTER — APPOINTMENT (OUTPATIENT)
Dept: BEHAVIORAL HEALTH | Facility: CLINIC | Age: 20
End: 2025-04-21
Payer: COMMERCIAL

## 2025-04-29 ENCOUNTER — TELEPHONE (OUTPATIENT)
Dept: OTHER | Age: 20
End: 2025-04-29

## 2025-04-29 DIAGNOSIS — F22 PARANOIA (MULTI): ICD-10-CM

## 2025-04-29 DIAGNOSIS — F31.9 BIPOLAR AFFECTIVE DISORDER, REMISSION STATUS UNSPECIFIED (MULTI): ICD-10-CM

## 2025-04-29 DIAGNOSIS — F41.0 ANXIETY ATTACK: ICD-10-CM

## 2025-04-29 RX ORDER — PALIPERIDONE 1.5 MG/1
1.5 TABLET, EXTENDED RELEASE ORAL DAILY
Qty: 15 TABLET | Refills: 0 | Status: SHIPPED | OUTPATIENT
Start: 2025-04-29

## 2025-04-29 RX ORDER — DIAZEPAM 5 MG/1
5 TABLET ORAL EVERY 8 HOURS PRN
Qty: 10 TABLET | Refills: 1 | Status: SHIPPED | OUTPATIENT
Start: 2025-04-29 | End: 2026-04-29

## 2025-04-29 NOTE — PROGRESS NOTES
Per mom, patient seems to be struggling with severe unrealistic paranoid delusions of being apprehended and jailed and possibly deported for her ADHD with autistic tendencies.  Patient has been crying uncontrollably, as a result, mom has not been able to go to work.  Patient will not leave the house because of fear of being arrested.  Patient skipped a dose of her Invega on Sunday, but per mom, has taken it consistently.  Patient is not sleeping or eating adequately.  Patient seems fearful to go to the hospital as he could provide grounds to be apprehended.  Discussed to give patient an extra dose of Invega 1.5 mg in addition to the 3 mg she took earlier today, also give patient 5 mg of Valium for near panic attacks.  Encouraged to feed patient small bites of food as well as encourage adequate fluid intake like protein shakes.  Mom notes severe abdominal discomfort due to patient pressing things against her abdomen and not eating.  Will follow-up during the next visit.  Patient has been provided resources to consider going to the hospital if patient's symptoms become worse.

## 2025-04-30 ENCOUNTER — APPOINTMENT (OUTPATIENT)
Dept: NEUROLOGY | Facility: CLINIC | Age: 20
End: 2025-04-30
Payer: COMMERCIAL

## 2025-04-30 VITALS
BODY MASS INDEX: 22.28 KG/M2 | HEIGHT: 61 IN | SYSTOLIC BLOOD PRESSURE: 109 MMHG | WEIGHT: 118 LBS | DIASTOLIC BLOOD PRESSURE: 72 MMHG | HEART RATE: 89 BPM

## 2025-04-30 DIAGNOSIS — G93.9 DISORDER OF BRAIN: Primary | ICD-10-CM

## 2025-04-30 DIAGNOSIS — F22: ICD-10-CM

## 2025-04-30 DIAGNOSIS — F90.9 ATTENTION DEFICIT HYPERACTIVITY DISORDER (ADHD), UNSPECIFIED ADHD TYPE: ICD-10-CM

## 2025-04-30 DIAGNOSIS — F39 MOOD DISORDER (CMS-HCC): ICD-10-CM

## 2025-04-30 PROCEDURE — 1036F TOBACCO NON-USER: CPT | Performed by: PSYCHIATRY & NEUROLOGY

## 2025-04-30 PROCEDURE — 3008F BODY MASS INDEX DOCD: CPT | Performed by: PSYCHIATRY & NEUROLOGY

## 2025-04-30 PROCEDURE — 99204 OFFICE O/P NEW MOD 45 MIN: CPT | Performed by: PSYCHIATRY & NEUROLOGY

## 2025-04-30 ASSESSMENT — ENCOUNTER SYMPTOMS
DYSPHORIC MOOD: 1
TREMORS: 0
EYE PAIN: 0
BRUISES/BLEEDS EASILY: 0
SLEEP DISTURBANCE: 1
DIFFICULTY URINATING: 0
NECK PAIN: 0
COUGH: 0
DECREASED CONCENTRATION: 1
FATIGUE: 0
SINUS PRESSURE: 0
HEADACHES: 0
BACK PAIN: 0
AGITATION: 0
UNEXPECTED WEIGHT CHANGE: 0
FACIAL ASYMMETRY: 0
VOMITING: 0
ADENOPATHY: 0
SPEECH DIFFICULTY: 0
WEAKNESS: 0
FREQUENCY: 0
PHOTOPHOBIA: 0
ABDOMINAL PAIN: 0
FEVER: 0
SHORTNESS OF BREATH: 0
CONFUSION: 0
LIGHT-HEADEDNESS: 0
TROUBLE SWALLOWING: 0
NAUSEA: 0
HALLUCINATIONS: 0
NUMBNESS: 0
HYPERACTIVE: 0
JOINT SWELLING: 0
PALPITATIONS: 0
SEIZURES: 0
DIZZINESS: 0
ARTHRALGIAS: 0
WHEEZING: 0
NECK STIFFNESS: 0

## 2025-04-30 ASSESSMENT — PATIENT HEALTH QUESTIONNAIRE - PHQ9
SUM OF ALL RESPONSES TO PHQ9 QUESTIONS 1 & 2: 2
10. IF YOU CHECKED OFF ANY PROBLEMS, HOW DIFFICULT HAVE THESE PROBLEMS MADE IT FOR YOU TO DO YOUR WORK, TAKE CARE OF THINGS AT HOME, OR GET ALONG WITH OTHER PEOPLE: SOMEWHAT DIFFICULT
1. LITTLE INTEREST OR PLEASURE IN DOING THINGS: SEVERAL DAYS
2. FEELING DOWN, DEPRESSED OR HOPELESS: SEVERAL DAYS

## 2025-04-30 NOTE — PROGRESS NOTES
Delfina Ramirez  20 y.o.       SUBJECTIVE  Delfina is a 20-year-old young girl who was seen today for evaluation of a possible attention deficit disorder with difficulty with school at home.  She was recently diagnosed with paranoid delusional disorder and currently she is on Invega.  She was diagnosed with ADHD when she was a child and did take Concerta which did seem to help her.  Today her physical and neurological exam is normal.  I would like to continue all her other medications the way she is taking but avoid using any stimulant since it could probably precipitate her delusions and paranoia.  I have advised her to discuss that with her psychiatrist and if she is still having issues with her ADHD may consider nonstimulant like Intuniv or clonidine or Kapvay.  I have advised him to follow-up with the psychiatrist and psychologist and have not scheduled any follow-up appointment with me    As you recall, Delfina is a 20-year-old young girl who has been having difficulty with her attention span constant focusing her on the diagnostic checklist she had 9 out of 9 symptoms for inattentive and 8 out of 9 for Hyper-Tet type of ADHD    She has been having issues with delusions and she was diagnosed with paranoid delusional disorder and she was still having some symptoms and started on medicine and recently had a dose increase.    As a child in first grade she was diagnosed with ADHD and took Concerta ~8 grade which does seem to have helped her    Her birth and developmental history's were unremarkable    At present she lives with her parents and has a 22-year-old sister who has a history of depression.  There is a strong family history of bipolar on maternal side with cousin and maternal grandmother.  No history is available from dad side    No history of any smoking alcohol or substance abuse but she does take marijuana for sleep  Due to technical limitations of voice recognition and human error, this note may not  "accurately reflect the care of the patient.    Review of Systems   Constitutional:  Negative for fatigue, fever and unexpected weight change.   HENT:  Negative for dental problem, ear pain, hearing loss, sinus pressure, tinnitus and trouble swallowing.    Eyes:  Negative for photophobia, pain and visual disturbance.   Respiratory:  Negative for cough, shortness of breath and wheezing.    Cardiovascular:  Negative for chest pain, palpitations and leg swelling.   Gastrointestinal:  Negative for abdominal pain, nausea and vomiting.   Genitourinary:  Negative for difficulty urinating, enuresis and frequency.   Musculoskeletal:  Negative for arthralgias, back pain, joint swelling, neck pain and neck stiffness.   Skin:  Negative for pallor and rash.   Allergic/Immunologic: Negative for food allergies.   Neurological:  Negative for dizziness, tremors, seizures, syncope, facial asymmetry, speech difficulty, weakness, light-headedness, numbness and headaches.   Hematological:  Negative for adenopathy. Does not bruise/bleed easily.   Psychiatric/Behavioral:  Positive for behavioral problems, decreased concentration, dysphoric mood and sleep disturbance. Negative for agitation, confusion and hallucinations. The patient is not hyperactive.         Problem List[1]  Medical History[2]  Surgical History[3]    reports that she has quit smoking. Her smoking use included cigarettes. She has been exposed to tobacco smoke. She has never used smokeless tobacco. She reports that she does not currently use alcohol. She reports current drug use. Frequency: 7.00 times per week. Drug: Marijuana.    /72 (BP Location: Right arm, Patient Position: Sitting, BP Cuff Size: Small adult)   Pulse 89   Ht 1.549 m (5' 1\")   Wt 53.5 kg (118 lb)   BMI 22.30 kg/m²     OBJECTIVE  Physical Exam/Neurological Exam   Constitutional: General appearance: no acute distress   Auscultation of Heart: Regular rate and rhythm, no murmurs, normal S1 and S2. "   Carotid Arteries: Intact without any bruits.   Neck is supple.   No lymph adenopathy.   Peripheral Vascular Exam: Pulses +2 and equal in all extremities. No swelling, varicosities, edema or tenderness to palpations.    Abdomen is soft, nondistended. No organomegaly.  Mental status: The patient was in no distress, alert, interactive and cooperative. Affect is appropriate.   Orientation: oriented to person, oriented to place and oriented to time.   Memory: recent memory intact and remote memory intact.   Attention: normal attention span and normal concentrating ability.   Language: normal comprehension and no difficulty naming common objects.   Fund of knowledge: Patient displays adequate knowledge of current events, adequate fund of knowledge regarding past history and adequate fund of knowledge regarding vocabulary.   Eyes: The ophthalmoscopic examination was normal. The fundi are visualized with normal disc margins and without.  Cranial nerve II: Visual fields full to confrontation.   Cranial nerves III, IV, and VI: Pupils round, equally reactive to light; no ptosis. EOMs intact. No nystagmus.   Cranial Nerve V: Facial sensation intact bilaterally.   Cranial nerve VII: Normal and symmetric facial strength.   Cranial nerve VIII: Hearing is intact bilaterally to finger rub / whisper.   Cranial nerves IX and X: Palate elevates symmetrically.   Cranial nerve XI: Shoulder shrug and neck rotation strength are intact.   Cranial nerve XII: Tongue midline with normal strength.   Motor: Motor exam was normal. Muscle bulk was normal in both upper and lower extremities. Muscle tone was normal in both upper and lower extremities. Muscle strength was 5/5 throughout. no abnormal or adventitious movements were present.   Deep Tendon Reflexes: left biceps 2+ , right biceps 2+, left triceps 2+, right triceps 2+, left brachioradialis 2+, right brachioradialis 2+, left patella 2+, right patella 2+, left ankle jerk 2+, right ankle  jerk 2+   Plantar Reflex: Toes downgoing to plantar stimulation on the left. Toes downgoing to plantar stimulation on the right.   Sensory Exam: Normal to light touch.   Coordination: There is no limb dystaxia and rapid alternating movements are intact.  Gait: Gait is normal without spasticity, ataxia or bradykinesia. Stance is stable with a negative Romberg.      ASSESSMENT/PLAN  Diagnoses and all orders for this visit:  Disorder of brain  Attention deficit hyperactivity disorder (ADHD), unspecified ADHD type  Mood disorder (CMS-HCC)  Paranoid type delusional disorder (Multi)        Atul Ortiz MD  4/30/2025  4:56 PM       [1]   Patient Active Problem List  Diagnosis    Depression    Mood disorder (CMS-HCC)    Anxiety attack    History of ADHD    Depressive disorder    Transient insomnia    Anxiety    Abdominal pain, epigastric    Acute gastric ulcer without hemorrhage or perforation    Leukocytosis    BMI less than 19,adult    Skin lesions    Otalgia    Deliberate self-cutting    Attention deficit hyperactivity disorder (ADHD)    Acute right otitis media    Abdominal pain    Suicidal ideation    Mood swings    Laceration of thigh    Dysuria    Dyspepsia    Dysmenorrhea    Laceration of thigh, left    Urinary tract infection    Mild anxiety    Head pain    Headache    Right ear pain    Routine general medical examination at a health care facility    Cyclic vomiting syndrome    Dehydration    Intractable nausea and vomiting    Marijuana use    Intractable generalized abdominal pain    ADHD (attention deficit hyperactivity disorder)   [2]   Past Medical History:  Diagnosis Date    ADHD     Anxiety    [3]   Past Surgical History:  Procedure Laterality Date    OTHER SURGICAL HISTORY  12/09/2020    No history of surgery

## 2025-05-12 ENCOUNTER — APPOINTMENT (OUTPATIENT)
Dept: BEHAVIORAL HEALTH | Facility: CLINIC | Age: 20
End: 2025-05-12
Payer: COMMERCIAL

## 2025-05-12 DIAGNOSIS — G47.9 SLEEP DISTURBANCE: ICD-10-CM

## 2025-05-12 DIAGNOSIS — F22 PARANOIA (MULTI): ICD-10-CM

## 2025-05-12 DIAGNOSIS — F41.0 ANXIETY ATTACK: ICD-10-CM

## 2025-05-12 DIAGNOSIS — F90.0 ATTENTION DEFICIT HYPERACTIVITY DISORDER (ADHD), PREDOMINANTLY INATTENTIVE TYPE: ICD-10-CM

## 2025-05-12 DIAGNOSIS — F31.9 BIPOLAR AFFECTIVE DISORDER, REMISSION STATUS UNSPECIFIED (MULTI): ICD-10-CM

## 2025-05-12 PROBLEM — L98.9 SKIN LESIONS: Status: RESOLVED | Noted: 2024-06-27 | Resolved: 2025-05-12

## 2025-05-12 PROBLEM — H92.09 OTALGIA: Status: RESOLVED | Noted: 2024-06-27 | Resolved: 2025-05-12

## 2025-05-12 PROBLEM — D72.829 LEUKOCYTOSIS: Status: RESOLVED | Noted: 2024-06-10 | Resolved: 2025-05-12

## 2025-05-12 PROBLEM — R10.13 ABDOMINAL PAIN, EPIGASTRIC: Status: RESOLVED | Noted: 2024-06-10 | Resolved: 2025-05-12

## 2025-05-12 PROCEDURE — 99214 OFFICE O/P EST MOD 30 MIN: CPT

## 2025-05-12 PROCEDURE — 1036F TOBACCO NON-USER: CPT

## 2025-05-12 RX ORDER — DIAZEPAM 5 MG/1
5 TABLET ORAL DAILY PRN
Qty: 15 TABLET | Refills: 1 | Status: SHIPPED | OUTPATIENT
Start: 2025-05-12 | End: 2026-05-12

## 2025-05-12 RX ORDER — PALIPERIDONE 3 MG/1
TABLET, EXTENDED RELEASE ORAL
COMMUNITY
Start: 2025-04-16

## 2025-05-12 RX ORDER — PALIPERIDONE 1.5 MG/1
1.5 TABLET, EXTENDED RELEASE ORAL DAILY
Qty: 30 TABLET | Refills: 0 | Status: SHIPPED | OUTPATIENT
Start: 2025-05-12 | End: 2025-06-11

## 2025-05-12 NOTE — PROGRESS NOTES
"Delfina Ramirez is a 20 y.o. female patient presenting for a(an) virtual FUV  Visit location: patient (Delfina Ramirez, home), [facility] [place]  Pt identify self by name, , and address    Chief Complaint   Patient presents with    ADHD    Anxiety    Schizoaffective Disorder    Sleeping Problem    Follow-up    Med Management    Depression    Panic Attack     HPI      \"Since upping meds, I'm not going crazy.\"   Mom states \"she's still scared, can't go for long time along. Can't sleep at night unless mom is around or sister sleeping on the floor in her room.\" Mom has been slowly adjusting meds given derealization with jump of 1.5 mg at once.  Atarax doesn't help anymore.   Valium \"has been life a lifeline to bring her back, but has not taken for the last few days.\"  Denies minimal existential delusions of paranoia on current dose of Invega.  Dose was decreased from 4.5 mg back to 3 mg with intentions to attempt 3.75 mg starting today.  Denies severe mood swings or panic attacks  Reports ineffectiveness of hydroxyzine, but finds Valium very effective in controlling anxiety symptoms  Denies feeling depressed   continue to struggle with attention/focus/concentration, consulted neurology for ADHD management,-neurology recommended to follow-up with psychiatry but discouraged the use of stimulants  Denies any noticeable side effects from medications  Denies substance use    Current S/Sx:  -Mood: Occasional mood swings, easy anger outburst/irritability remains present  -Depressive mood: PHQ-9 (2)  -Fatigue/Energy: Moderate  -Feeling hope/help/worthless: denies  -Sleep: Significant difficulties remain present, but improving with current dose of mirtazapine  -Motivation: average  -Appetite/Weight Changes: Improved appetite, no weight changes  -Psychosis: denies hallucinations/delusions  -SI/HI: Denies current suicidal intent/plan  -Guns/Weapons at home: denies   " "  -Anxiety: Reports intermittent moments of feeling overwhelmed/apprehensive  -NADEEN-7 (4)     Panic attacks  denies     HISTORY  PSYCH HISTORY  -Psych Hx: ADHD, depression, anxiety, and mood disorder  -Psych Hospitalization Hx: Voluntary admission into [facility] last month. \"Called the  at 0200 to seek help because I was freaking out and they took me to the hospital.\" Was monitored for 24 hours then released unmedicated.   -Suicide Attempt Hx: denies  -Self-Harm/Violence Hx: Freshman, Sophomore, high school, but it's been a while since last cut  -Current psych meds: Concerta 18 mg daily, Hydroxyzine 25 mg daily as needed for anxiety, Valium 2 mg every 8 hours as needed for muscle spasms (given at the ER-mom stores it in her room).   -Psych Med Hx: Buspirone, Abilify 2 mg, Lithium 300 mg, several ADHD meds, on antidepressants (Prozac) - can't remember the names     SUBSTANCE USE HISTORY  -Substance Use Hx: uses marijuana every night to help sleep and anxiety  -ETOH: denies  -Tobacco: nicotine  -Caffeine: sometimes  -Substance Abuse Treatment Hx: denies     FAMILY HISTORY  -Family Psych Hx: maternal side: bipolar d/o, paternal side: depression   -Family Suicide Hx: denies  -Family Substance Abuse Hx: unsure     SOCIAL HISTORY  -Upbringing: Grew up with both parents in different households. Has 1 sister, 2 step sisters  -Income: denies financial struggle  -Safety: feels safe at home/generally  -Support system: average  -Trauma: emotional  -Education: GED  -Work: [employer]  -Marital Status: single  -Children: denies  -Living situation: Lives with mom and her boyfriend  -: denies  -Legal: denies      MEDICAL HISTORY  -PCP: Camilo Chaparro, DO  -TBI/head trauma/LOC/seizure hx: denies     REVIEW OF SYSTEMS  Review of Systems   All other systems reviewed and are negative.       PHYSICAL EXAM  Physical Exam  Psychiatric:         Attention and Perception: Attention and perception normal.         Mood and Affect: " Mood and affect normal.         Speech: Speech normal.         Behavior: Behavior normal. Behavior is cooperative.         Thought Content: Thought content normal.         Cognition and Memory: Cognition and memory normal.         Judgment: Judgment normal.          IMPRESSION  FUV today via in virtual. Patient reports improved paranoia/delusions/mood symptoms on current dose of Invega 3 mg daily with the goal to increase to 3.75 mg today.  Per mom, increasing Invega from 3 mg to 4.5 mg was a big jump, so dose had to be down titrated and slowly tapered up  (mom and patient aware that is a long-acting medication but the benefits of getting again have always the risks).  Per mom, patient continue to struggle with delusional symptoms of paranoia and requests someone to be close to her at all times especially at bedtime, so mom often stays by her at night or sister will sleep with her in the same room.  Otherwise, reports mostly manage anxiety on intermittent use of Valium, but irrigation provided about intentions only use Valium short-term due to habit-forming tendencies on this regimen, will discuss long-term anxiety medication during the next visit.  Education also provided on the benefits of long-acting injectables, will consider starting an BENNETT (Invega Sustenna 117 mg monthly) in the next 2 visits if patient's symptoms become stable on intended Invega 4.5 mg dose daily.  Denies substance use.  Denies any noticeable side effects from medication.  Denies SI/HI/AVH/akosua/hypomania, but endorses delusions.  Sleep and appetite are better.  Denies panic attacks.  Continue to struggle with focus/attention/concentration, will consider Intuniv after mood symptoms and psychotic symptoms have been stabilized given neurology recommendations.  Will continue Invega 3 to 4.5 mg daily, continue Valium as needed for elevated anxiety/panic attacks.  Will follow up with this provider in 1 weeks to continue monitoring, or sooner if  needed.      Plan:     1. Reviewed diagnostic impression including subjective and objective data and provided education about Anxiety, MDD, bipolar disorder, ADHD, and Sleep disturbance, paranoia, etiology, treatment recommendations including medication, therapy, course of treatment and prognosis. Patient amenable to treatment plan.     2. Safety Assessment: History of no thoughts, but denies current thoughts of SI, plan/intent.  No h/o SA. RF include  and unmarried/single. PF include strong coping skills, positive family relationships, and employment, engaged in care, future oriented, no guns.  Low imminent risk     3. @  Problem List Items Addressed This Visit       Anxiety attack    Relevant Medications    diazePAM (Valium) 5 mg tablet    ADHD (attention deficit hyperactivity disorder)     Other Visit Diagnoses         Paranoia (Multi)        Relevant Medications    paliperidone (Invega) 1.5 mg 24 hr tablet      Bipolar affective disorder, remission status unspecified (Multi)        Relevant Medications    paliperidone (Invega) 1.5 mg 24 hr tablet      Sleep disturbance              CONTINUE Invega 3-4.5 mg daily depending on tolerance  PENDING Invega 117 mg IM monthly  HOLD hydroxyzine 25 mg, take 0.5-1 tabs daily as needed for severe anxiety/panic attacks  CONTINUE Valium 2.5 to 5 mg daily as needed for severe anxiety/panic attacks  PENDING (anxiolytic)  PENDING Intuniv 1 mg daily - ADHD     Reviewed r/b/a, possible side effects of the medication. Client is aware about the benefit outweighs the risk.     4. INDIVIDUAL THERAPY:  none at the moment     5. OARRS: I have personally reviewed the OARRS report for Delfina Ramirez. I have considered the risks of abuse, dependence, addiction and diversion. Last filled Concerta 18 mg on 10/28/2024  (30 tabs x 30 days)     6. Labs reviewed    7. Last Urine Drug Screen: Date of Last Screen: 08/21/2024, +ve cannabinoids, UDS reordered    8. Controlled Substance  Agreement: Date of the Last Agreement: 09/10/2024  Reviewed Controlled Substance Agreement including but not limited to the benefits, risks, and alternatives to treatment with a Controlled Substance medication(s).    9. Follow-up with this provider in 5 weeks.    10. -Total time: 30 minutes via in person     - Follow up with physical health providers as scheduled  - May follow up sooner if experiences worsening symptoms by calling  Psychiatry at [phone]  - Patient verbalized an understanding to call [place] at [phone] ([facility]), [phone], or [phone]/go to the nearest emergency room if experiences thoughts of harm to self or others.

## 2025-05-19 ENCOUNTER — APPOINTMENT (OUTPATIENT)
Dept: BEHAVIORAL HEALTH | Facility: CLINIC | Age: 20
End: 2025-05-19
Payer: COMMERCIAL

## 2025-05-19 VITALS
HEART RATE: 71 BPM | DIASTOLIC BLOOD PRESSURE: 55 MMHG | BODY MASS INDEX: 22.54 KG/M2 | HEIGHT: 61 IN | RESPIRATION RATE: 20 BRPM | WEIGHT: 119.4 LBS | SYSTOLIC BLOOD PRESSURE: 92 MMHG

## 2025-05-19 DIAGNOSIS — F22 PARANOIA (MULTI): ICD-10-CM

## 2025-05-19 DIAGNOSIS — F33.2 SEVERE EPISODE OF RECURRENT MAJOR DEPRESSIVE DISORDER, WITHOUT PSYCHOTIC FEATURES (MULTI): ICD-10-CM

## 2025-05-19 DIAGNOSIS — F31.9 BIPOLAR AFFECTIVE DISORDER, REMISSION STATUS UNSPECIFIED (MULTI): ICD-10-CM

## 2025-05-19 DIAGNOSIS — F41.1 GAD (GENERALIZED ANXIETY DISORDER): ICD-10-CM

## 2025-05-19 DIAGNOSIS — F90.0 ATTENTION DEFICIT HYPERACTIVITY DISORDER (ADHD), PREDOMINANTLY INATTENTIVE TYPE: ICD-10-CM

## 2025-05-19 PROCEDURE — 99214 OFFICE O/P EST MOD 30 MIN: CPT

## 2025-05-19 PROCEDURE — 3008F BODY MASS INDEX DOCD: CPT

## 2025-05-19 RX ORDER — VENLAFAXINE HYDROCHLORIDE 37.5 MG/1
37.5 CAPSULE, EXTENDED RELEASE ORAL DAILY
Qty: 30 CAPSULE | Refills: 1 | Status: SHIPPED | OUTPATIENT
Start: 2025-05-19 | End: 2025-07-18

## 2025-05-19 NOTE — PROGRESS NOTES
"Delfina Ramirez is a 20 y.o. female patient presenting for a(an) in person FUV  Visit location: patient (Delfina Ramirez, home), [facility] [place] [facility]  Pt identify self by name, , and address    Chief Complaint   Patient presents with    Anxiety    Depression    Schizoaffective Disorder    Panic Attack    Follow-up    Manic Behavior    Paranoia     HPI    2025  Reports worsening symptoms of anxiety  Per mom, \"her 22-year-old sister sleeps on the floor in the mattress as she would not sleep in the room by herself, if her sister is not around, then I will have to sleep on the couch even though my room is just a few feet away from hers.\"  Reports improved paranoia.  Had to return to Invega 3 mg as 3.75 mg feels with too activating  Denies panic attacks, mood swings, feeling depressed  Continue to struggle with attention deficit symptoms  Denies SI/HI/AVH/delusions/akosua/hypomania-mostly managed  continue to struggle with attention/focus/concentration, consulted neurology for ADHD management,-neurology recommended to follow-up with psychiatry but discouraged the use of stimulants  Denies any noticeable side effects from medications  Denies substance use    Current S/Sx:  -Mood: Occasional mood swings, easy anger outburst/irritability remains present  -Depressive mood: PHQ-9 (2)  -Fatigue/Energy: Moderate  -Feeling hope/help/worthless: denies  -Sleep: Significant difficulties remain present, but improving with current dose of mirtazapine  -Motivation: average  -Appetite/Weight Changes: Improved appetite, no weight changes  -Psychosis: denies hallucinations/delusions  -SI/HI: Denies current suicidal intent/plan  -Guns/Weapons at home: denies     -Anxiety: Reports intermittent moments of feeling overwhelmed/apprehensive  -NADEEN-7 (4)     Panic attacks  denies     HISTORY  PSYCH HISTORY  -Psych Hx: ADHD, depression, anxiety, and mood disorder  -Psych Hospitalization Hx: " "Voluntary admission into [facility] last month. \"Called the  at 0200 to seek help because I was freaking out and they took me to the hospital.\" Was monitored for 24 hours then released unmedicated.   -Suicide Attempt Hx: denies  -Self-Harm/Violence Hx: Freshman, Sophomore, high school, but it's been a while since last cut  -Current psych meds: Concerta 18 mg daily, Hydroxyzine 25 mg daily as needed for anxiety, Valium 2 mg every 8 hours as needed for muscle spasms (given at the ER-mom stores it in her room).   -Psych Med Hx: Buspirone, Abilify 2 mg, Lithium 300 mg, several ADHD meds, on antidepressants (Prozac) - can't remember the names     SUBSTANCE USE HISTORY  -Substance Use Hx: uses marijuana every night to help sleep and anxiety  -ETOH: denies  -Tobacco: nicotine  -Caffeine: sometimes  -Substance Abuse Treatment Hx: denies     FAMILY HISTORY  -Family Psych Hx: maternal side: bipolar d/o, paternal side: depression   -Family Suicide Hx: denies  -Family Substance Abuse Hx: unsure     SOCIAL HISTORY  -Upbringing: Grew up with both parents in different households. Has 1 sister, 2 step sisters  -Income: denies financial struggle  -Safety: feels safe at home/generally  -Support system: average  -Trauma: emotional  -Education: GED  -Work: [employer]  -Marital Status: single  -Children: denies  -Living situation: Lives with mom and her boyfriend  -: denies  -Legal: denies      MEDICAL HISTORY  -PCP: Camilo Chaparro DO  -TBI/head trauma/LOC/seizure hx: denies     REVIEW OF SYSTEMS  Review of Systems   All other systems reviewed and are negative.       PHYSICAL EXAM  Physical Exam  Psychiatric:         Attention and Perception: Attention and perception normal.         Mood and Affect: Mood and affect normal.         Speech: Speech normal.         Behavior: Behavior normal. Behavior is cooperative.         Thought Content: Thought content normal.         Cognition and Memory: Cognition and memory normal.    "      Judgment: Judgment normal.          IMPRESSION  FUV today via in person. Patient reports improved paranoia/delusions/mood symptoms on current dose of Invega 3 mg daily, did not tolerate 3.75 mg, so tapered down to 3 mg 2 days ago due to over activation and feeling the way she felt on 4.5 mg.  Reports ongoing difficulties with anxiety, tries to limit Valium administration for fear of habit-forming tendencies.  Denies depression, mood swings, panic attacks.  Reports improved delusional behaviors, denies SI/HI/AVH/delusions/akosua/hypomania since last visit.  Sleep and appetite are better.  Invega was 117 mg BENNETT remains a consideration, but will hold for now until mood symptoms improve.  Denies substance use. Sleep and appetite are better.  Denies panic attacks.  Continue to struggle with focus/attention/concentration, will consider Intuniv after mood symptoms and psychotic symptoms have been stabilized given neurology recommendations.  Will start Effexor XR 37.5 mg daily, continue Invega 3 mg daily, continue Valium as needed for elevated anxiety/panic attacks.  Will follow up with this provider in 2 weeks to continue monitoring, or sooner if needed.      Plan:     1. Reviewed diagnostic impression including subjective and objective data and provided education about Anxiety, MDD, bipolar disorder, ADHD, and Sleep disturbance, paranoia, etiology, treatment recommendations including medication, therapy, course of treatment and prognosis. Patient amenable to treatment plan.     2. Safety Assessment: History of no thoughts, but denies current thoughts of SI, plan/intent.  No h/o SA. RF include  and unmarried/single. PF include strong coping skills, positive family relationships, and employment, engaged in care, future oriented, no guns.  Low imminent risk     3. @  Problem List Items Addressed This Visit       Depression    Relevant Medications    venlafaxine XR (Effexor XR) 37.5 mg 24 hr capsule    ADHD  (attention deficit hyperactivity disorder)     Other Visit Diagnoses         NADEEN (generalized anxiety disorder)        Relevant Medications    venlafaxine XR (Effexor XR) 37.5 mg 24 hr capsule      Paranoia (Multi)          Bipolar affective disorder, remission status unspecified (Multi)              CONTINUE Invega 3-4.5 mg daily depending on tolerance  PENDING Invega 117 mg IM monthly  CONTINUE Valium 2.5 to 5 mg daily as needed for severe anxiety/panic attacks  PENDING Intuniv 1 mg daily - ADHD  START Effexor XR 37.5 mg daily     Reviewed r/b/a, possible side effects of the medication. Client is aware about the benefit outweighs the risk.     4. INDIVIDUAL THERAPY:  none at the moment     5. OARRS: I have personally reviewed the OARRS report for Delfina Ramirez. I have considered the risks of abuse, dependence, addiction and diversion. Last filled Concerta 18 mg on 10/28/2024  (30 tabs x 30 days)     6. Labs reviewed    7. Last Urine Drug Screen: Date of Last Screen: 08/21/2024, +ve cannabinoids, UDS reordered    8. Controlled Substance Agreement: Date of the Last Agreement: 09/10/2024  Reviewed Controlled Substance Agreement including but not limited to the benefits, risks, and alternatives to treatment with a Controlled Substance medication(s).    9. Follow-up with this provider in 2 weeks.    10. -Total time: 25 minutes via in person     - Follow up with physical health providers as scheduled  - May follow up sooner if experiences worsening symptoms by calling  Psychiatry at [phone]  - Patient verbalized an understanding to call [place] [facility] at [phone] ([facility]), 211, or [phone]/go to the nearest emergency room if experiences thoughts of harm to self or others.

## 2025-06-04 ENCOUNTER — APPOINTMENT (OUTPATIENT)
Dept: BEHAVIORAL HEALTH | Facility: CLINIC | Age: 20
End: 2025-06-04
Payer: COMMERCIAL

## 2025-06-04 DIAGNOSIS — G47.9 SLEEP DISTURBANCE: ICD-10-CM

## 2025-06-04 DIAGNOSIS — F31.9 BIPOLAR AFFECTIVE DISORDER, REMISSION STATUS UNSPECIFIED (MULTI): ICD-10-CM

## 2025-06-04 DIAGNOSIS — F41.1 GAD (GENERALIZED ANXIETY DISORDER): ICD-10-CM

## 2025-06-04 DIAGNOSIS — F33.2 SEVERE EPISODE OF RECURRENT MAJOR DEPRESSIVE DISORDER, WITHOUT PSYCHOTIC FEATURES (MULTI): ICD-10-CM

## 2025-06-04 DIAGNOSIS — F22 PARANOIA (MULTI): ICD-10-CM

## 2025-06-04 DIAGNOSIS — F90.0 ATTENTION DEFICIT HYPERACTIVITY DISORDER (ADHD), PREDOMINANTLY INATTENTIVE TYPE: ICD-10-CM

## 2025-06-04 PROCEDURE — 99214 OFFICE O/P EST MOD 30 MIN: CPT

## 2025-06-04 RX ORDER — VENLAFAXINE HYDROCHLORIDE 37.5 MG/1
75 CAPSULE, EXTENDED RELEASE ORAL DAILY
Qty: 30 CAPSULE | Refills: 1 | Status: SHIPPED | OUTPATIENT
Start: 2025-06-04 | End: 2025-07-04

## 2025-06-04 NOTE — PROGRESS NOTES
"Delfina Ramirez is a 20 y.o. female patient presenting for a(an) in person FUV  Visit location: patient (Delfina Ramirez, home), [facility]  Pt identify self by name, , and address    Chief Complaint   Patient presents with    ADHD    Anorexia    Depression    Follow-up    Med Management    Paranoia    Bipolar    Sleeping Problem     HPI    2025  \"Everything is going pretty good, nothing fancy going on.\"   Express +ve tolerance of Effexor XR 37.5 mg without any noticeable side effects/discomfort. Reports mild improvement with anxiety since starting Effexor. Currently taking Invega 3 mg daily. Has not needed Valium since last visit.   Sleeping well, eating normally  Denies panic attacks or mood swings but endorse \"paranoia still comes and goes.\"  Continue to struggle with attention deficit symptoms especially at school, \"have the attention of a 2 yr old.\"   Denies SI/HI/AVH/akosua/hypomania-mostly managed  Denies any noticeable side effects from other medications  Denies substance use    Current S/Sx:  -Mood: Occasional mood swings, easy anger outburst/irritability remains present  -Depressive mood: PHQ-9 (2)  -Fatigue/Energy: Moderate  -Feeling hope/help/worthless: denies  -Sleep: Significant difficulties remain present, but improving with current dose of mirtazapine  -Motivation: average  -Appetite/Weight Changes: Improved appetite, no weight changes  -Psychosis: denies hallucinations/delusions  -SI/HI: Denies current suicidal intent/plan  -Guns/Weapons at home: denies     -Anxiety: Reports intermittent moments of feeling overwhelmed/apprehensive  -NADEEN-7 (4)     Panic attacks  denies     HISTORY  PSYCH HISTORY  -Psych Hx: ADHD, depression, anxiety, and mood disorder  -Psych Hospitalization Hx: Voluntary admission into [facility] last month. \"Called the  at 0200 to seek help because I was freaking out and they took me to the hospital.\" Was monitored for 24 " hours then released unmedicated.   -Suicide Attempt Hx: denies  -Self-Harm/Violence Hx: Freshman, Sophomore, high school, but it's been a while since last cut  -Current psych meds: Concerta 18 mg daily, Hydroxyzine 25 mg daily as needed for anxiety, Valium 2 mg every 8 hours as needed for muscle spasms (given at the ER-mom stores it in her room).   -Psych Med Hx: Buspirone, Abilify 2 mg, Lithium 300 mg, several ADHD meds, on antidepressants (Prozac) - can't remember the names     SUBSTANCE USE HISTORY  -Substance Use Hx: uses marijuana every night to help sleep and anxiety  -ETOH: denies  -Tobacco: nicotine  -Caffeine: sometimes  -Substance Abuse Treatment Hx: denies     FAMILY HISTORY  -Family Psych Hx: maternal side: bipolar d/o, paternal side: depression   -Family Suicide Hx: denies  -Family Substance Abuse Hx: unsure     SOCIAL HISTORY  -Upbringing: Grew up with both parents in different households. Has 1 sister, 2 step sisters  -Income: denies financial struggle  -Safety: feels safe at home/generally  -Support system: average  -Trauma: emotional  -Education: GED  -Work: [employer]  -Marital Status: single  -Children: denies  -Living situation: Lives with mom and her boyfriend  -: denies  -Legal: denies      MEDICAL HISTORY  -PCP: Camilo Chaparro DO  -TBI/head trauma/LOC/seizure hx: denies     REVIEW OF SYSTEMS  Review of Systems   All other systems reviewed and are negative.       PHYSICAL EXAM  Physical Exam  Psychiatric:         Attention and Perception: Attention and perception normal.         Mood and Affect: Mood and affect normal.         Speech: Speech normal.         Behavior: Behavior normal. Behavior is cooperative.         Thought Content: Thought content normal.         Cognition and Memory: Cognition and memory normal.         Judgment: Judgment normal.       IMPRESSION  FUV today via in person. Patient reports paranoia/delusions/mood symptoms continue to improve, but only taking Invega 3  mg daily.  Tolerating Effexor XR 37.5 mg well with no issues, so far, finds it minimally effective for anxiety but has not noticed any side effects since starting.  Otherwise, denies mood swings or panic attacks.  Denies substance use.  Denies any noticeable side effects from the current dose of Invega.  Has not needed Valium since the last visit.  Continue to struggle with attention deficit symptoms including poor focus/attention/concentration but hesitant to try thyroid medication (Intuniv 1 mg daily) as previously recommended.  Denies SI/HI/AVH/delusions/akosua/hypomania.  Sleep and appetite remain unaffected.  Discussed to increase Effexor XR 75 mg daily, continue Invega 3 mg daily and Valium as needed for elevated anxiety/panic attacks.  Will follow up with this provider in 2 weeks to continue monitoring, or sooner if needed.      Plan:     1. Reviewed diagnostic impression including subjective and objective data and provided education about Anxiety, MDD, bipolar disorder, ADHD, and Sleep disturbance, paranoia, etiology, treatment recommendations including medication, therapy, course of treatment and prognosis. Patient amenable to treatment plan.     2. Safety Assessment: History of no thoughts, but denies current thoughts of SI, plan/intent.  No h/o SA. RF include  and unmarried/single. PF include strong coping skills, positive family relationships, and employment, engaged in care, future oriented, no guns.  Low imminent risk     3. @  Problem List Items Addressed This Visit       Depression    Relevant Medications    venlafaxine XR (Effexor XR) 37.5 mg 24 hr capsule    ADHD (attention deficit hyperactivity disorder)     Other Visit Diagnoses         NADEEN (generalized anxiety disorder)        Relevant Medications    venlafaxine XR (Effexor XR) 37.5 mg 24 hr capsule      Paranoia (Multi)          Bipolar affective disorder, remission status unspecified (Multi)          Sleep disturbance               CONTINUE Invega 3-4.5 mg daily depending on tolerance  PENDING Invega 117 mg IM monthly  CONTINUE Valium 2.5 to 5 mg daily as needed for severe anxiety/panic attacks  PENDING Intuniv 1 mg daily - ADHD ( will like to just stay on 2 pills for now)  INCREASE Effexor XR 37.5 mg, take 2 caps (75 mg) daily until next visit     Reviewed r/b/a, possible side effects of the medication. Client is aware about the benefit outweighs the risk.     4. INDIVIDUAL THERAPY:  none at the moment     5. OARRS: I have personally reviewed the OARRS report for Delfina Ramirez. I have considered the risks of abuse, dependence, addiction and diversion. Last filled Concerta 18 mg on 10/28/2024  (30 tabs x 30 days)     6. Labs reviewed    7. Last Urine Drug Screen: Date of Last Screen: 08/21/2024, +ve cannabinoids, UDS reordered    8. Controlled Substance Agreement: Date of the Last Agreement: 09/10/2024  Reviewed Controlled Substance Agreement including but not limited to the benefits, risks, and alternatives to treatment with a Controlled Substance medication(s).    9. Follow-up with this provider in 2 weeks.    10. -Total time: 22 minutes via in person     - Follow up with physical health providers as scheduled  - May follow up sooner if experiences worsening symptoms by calling  Psychiatry at [phone]  - Patient verbalized an understanding to call [facility] at [phone] ([facility]), 211, or 121/go to the nearest emergency room if experiences thoughts of harm to self or others.

## 2025-06-16 DIAGNOSIS — F22 PARANOIA (MULTI): ICD-10-CM

## 2025-06-16 DIAGNOSIS — F31.9 BIPOLAR AFFECTIVE DISORDER, REMISSION STATUS UNSPECIFIED (MULTI): ICD-10-CM

## 2025-06-17 RX ORDER — PALIPERIDONE 3 MG/1
TABLET, EXTENDED RELEASE ORAL
Qty: 30 TABLET | Refills: 1 | Status: SHIPPED | OUTPATIENT
Start: 2025-06-17 | End: 2025-06-18 | Stop reason: SDUPTHER

## 2025-06-18 ENCOUNTER — APPOINTMENT (OUTPATIENT)
Dept: BEHAVIORAL HEALTH | Facility: CLINIC | Age: 20
End: 2025-06-18
Payer: COMMERCIAL

## 2025-06-18 ENCOUNTER — TELEMEDICINE (OUTPATIENT)
Dept: BEHAVIORAL HEALTH | Facility: CLINIC | Age: 20
End: 2025-06-18
Payer: COMMERCIAL

## 2025-06-18 DIAGNOSIS — F22 PARANOIA (MULTI): ICD-10-CM

## 2025-06-18 DIAGNOSIS — F33.2 SEVERE EPISODE OF RECURRENT MAJOR DEPRESSIVE DISORDER, WITHOUT PSYCHOTIC FEATURES (MULTI): ICD-10-CM

## 2025-06-18 DIAGNOSIS — F41.1 GAD (GENERALIZED ANXIETY DISORDER): ICD-10-CM

## 2025-06-18 DIAGNOSIS — F31.9 BIPOLAR AFFECTIVE DISORDER, REMISSION STATUS UNSPECIFIED (MULTI): ICD-10-CM

## 2025-06-18 DIAGNOSIS — F90.0 ATTENTION DEFICIT HYPERACTIVITY DISORDER (ADHD), PREDOMINANTLY INATTENTIVE TYPE: ICD-10-CM

## 2025-06-18 PROCEDURE — 99214 OFFICE O/P EST MOD 30 MIN: CPT

## 2025-06-18 RX ORDER — VENLAFAXINE HYDROCHLORIDE 75 MG/1
75 CAPSULE, EXTENDED RELEASE ORAL DAILY
Qty: 30 CAPSULE | Refills: 1 | Status: SHIPPED | OUTPATIENT
Start: 2025-06-18 | End: 2025-08-17

## 2025-06-18 RX ORDER — PALIPERIDONE 3 MG/1
3 TABLET, EXTENDED RELEASE ORAL EVERY MORNING
Qty: 90 TABLET | Refills: 1 | Status: SHIPPED | OUTPATIENT
Start: 2025-06-18 | End: 2025-12-15

## 2025-06-18 RX ORDER — GUANFACINE 1 MG/1
1 TABLET, EXTENDED RELEASE ORAL DAILY
Qty: 30 TABLET | Refills: 1 | Status: SHIPPED | OUTPATIENT
Start: 2025-06-18

## 2025-06-18 NOTE — PROGRESS NOTES
"Delfina Ramirez is a 20 y.o. female patient presenting for a(an) virtual FUV  Visit location: patient (Delfina Ramirez, home), [facility]  Pt identify self by name, , and address    Chief Complaint   Patient presents with    Bipolar    Paranoia    Anxiety    Depression    ADHD    Follow-up    Med Management    Panic Attack     HPI    2025  \"Everything is going good, the meds are working, don't feel super anxious.\"   Remain adherent to Effexor ER 75 mg daily. Continue to take Invega 3 mg daily.   Denies needing to take Valium.   Reports \"really bad panic attacks when I drive, but not mood swings, do get unreasonably angry.\"  Sleep/appetite - normal  Denies SI/HI/AVH/akosua/hypomania-mostly managed  Denies any noticeable side effects from other medications  Denies substance use except ongoing nicotine use    Current S/Sx:  -Mood: Occasional mood swings, easy anger outburst/irritability remains present  -Depressive mood: PHQ-9 (2)  -Fatigue/Energy: Moderate  -Feeling hope/help/worthless: denies  -Sleep: Significant difficulties remain present, but improving with current dose of mirtazapine  -Motivation: average  -Appetite/Weight Changes: Improved appetite, no weight changes  -Psychosis: denies hallucinations/delusions  -SI/HI: Denies current suicidal intent/plan  -Guns/Weapons at home: denies     -Anxiety: Reports intermittent moments of feeling overwhelmed/apprehensive  -NADEEN-7 (4)     Panic attacks  denies     HISTORY  PSYCH HISTORY  -Psych Hx: ADHD, depression, anxiety, and mood disorder  -Psych Hospitalization Hx: Voluntary admission into [facility] last month. \"Called the  at 0200 to seek help because I was freaking out and they took me to the hospital.\" Was monitored for 24 hours then released unmedicated.   -Suicide Attempt Hx: denies  -Self-Harm/Violence Hx: Freshman, Sophomore, high school, but it's been a while since last cut  -Current psych meds: " Concerta 18 mg daily, Hydroxyzine 25 mg daily as needed for anxiety, Valium 2 mg every 8 hours as needed for muscle spasms (given at the ER-mom stores it in her room).   -Psych Med Hx: Buspirone, Abilify 2 mg, Lithium 300 mg, several ADHD meds, on antidepressants (Prozac) - can't remember the names     SUBSTANCE USE HISTORY  -Substance Use Hx: uses marijuana every night to help sleep and anxiety  -ETOH: denies  -Tobacco: nicotine  -Caffeine: sometimes  -Substance Abuse Treatment Hx: denies     FAMILY HISTORY  -Family Psych Hx: maternal side: bipolar d/o, paternal side: depression   -Family Suicide Hx: denies  -Family Substance Abuse Hx: unsure     SOCIAL HISTORY  -Upbringing: Grew up with both parents in different households. Has 1 sister, 2 step sisters  -Income: denies financial struggle  -Safety: feels safe at home/generally  -Support system: average  -Trauma: emotional  -Education: GED  -Work: [employer]  -Marital Status: single  -Children: denies  -Living situation: Lives with mom and her boyfriend  -: denies  -Legal: denies      MEDICAL HISTORY  -PCP: Camilo Chaparro, DO  -TBI/head trauma/LOC/seizure hx: denies     REVIEW OF SYSTEMS  Review of Systems   All other systems reviewed and are negative.       PHYSICAL EXAM  Physical Exam  Psychiatric:         Attention and Perception: Attention and perception normal.         Mood and Affect: Mood and affect normal.         Speech: Speech normal.         Behavior: Behavior normal. Behavior is cooperative.         Thought Content: Thought content normal.         Cognition and Memory: Cognition and memory normal.         Judgment: Judgment normal.       IMPRESSION  FUV today via in person. Patient reports managed paranoia/delusions/mood symptoms on Invega 3 mg daily.  Anxiety is also managed on current dose of Effexor Effexor XR 75 mg daily.  Denies any noticeable side effects from medications.  Continue to struggle with attention deficit symptoms.  Denies  mood swings or panic attacks.  Denies substance use.  Denies SI/HI/AVH/delusions/akosua/hypomania.  Sleep and appetite remain have significantly improved.  Discussed to continue current regimen, add Intuniv ER 1 mg daily for attention deficit symptoms. Will follow up with this provider in 4 weeks to continue monitoring, or sooner if needed.      Plan:     1. Reviewed diagnostic impression including subjective and objective data and provided education about Anxiety, MDD, bipolar disorder, ADHD, and Sleep disturbance, paranoia, etiology, treatment recommendations including medication, therapy, course of treatment and prognosis. Patient amenable to treatment plan.     2. Safety Assessment: History of no thoughts, but denies current thoughts of SI, plan/intent.  No h/o SA. RF include  and unmarried/single. PF include strong coping skills, positive family relationships, and employment, engaged in care, future oriented, no guns.  Low imminent risk     3. @  Problem List Items Addressed This Visit       Depression    Relevant Medications    venlafaxine XR (Effexor XR) 75 mg 24 hr capsule    ADHD (attention deficit hyperactivity disorder)    Relevant Medications    guanFACINE (Intuniv) 1 mg ER 24 hr tablet     Other Visit Diagnoses         Bipolar affective disorder, remission status unspecified (Multi)        Relevant Medications    paliperidone (Invega) 3 mg 24 hr tablet      Paranoia (Multi)        Relevant Medications    paliperidone (Invega) 3 mg 24 hr tablet      NADEEN (generalized anxiety disorder)        Relevant Medications    venlafaxine XR (Effexor XR) 75 mg 24 hr capsule          CONTINUE Invega 3 mg daily   CONTINUE Effexor XR 75 mg daily  CONTINUE Valium 2.5 to 5 mg daily as needed for severe anxiety/panic attacks  START Intuniv ER 1 mg daily - ADHD ( will like to just stay on 2 pills for now)  PENDING Invega 117 mg IM monthly     Reviewed r/b/a, possible side effects of the medication. Client is aware  about the benefit outweighs the risk.     4. INDIVIDUAL THERAPY:  none at the moment     5. OARRS: I have personally reviewed the OARRS report for Delfina Ramirez. I have considered the risks of abuse, dependence, addiction and diversion. Last filled Concerta 18 mg on 10/28/2024  (30 tabs x 30 days)     6. Labs reviewed    7. Last Urine Drug Screen: Date of Last Screen: 08/21/2024, +ve cannabinoids, UDS reordered    8. Controlled Substance Agreement: Date of the Last Agreement: 09/10/2024  Reviewed Controlled Substance Agreement including but not limited to the benefits, risks, and alternatives to treatment with a Controlled Substance medication(s).    9. Follow-up with this provider in 4 weeks.    10. -Total time: 30 minutes via virtual     - Follow up with physical health providers as scheduled  - May follow up sooner if experiences worsening symptoms by calling  Psychiatry at [phone]  - Patient verbalized an understanding to call [facility] at [phone] ([facility]), 211, or 311/go to the nearest emergency room if experiences thoughts of harm to self or others.

## 2025-07-17 ENCOUNTER — APPOINTMENT (OUTPATIENT)
Dept: BEHAVIORAL HEALTH | Facility: CLINIC | Age: 20
End: 2025-07-17
Payer: COMMERCIAL

## 2025-08-13 ENCOUNTER — TELEMEDICINE (OUTPATIENT)
Dept: BEHAVIORAL HEALTH | Facility: CLINIC | Age: 20
End: 2025-08-13
Payer: COMMERCIAL

## 2025-08-13 DIAGNOSIS — F31.9 BIPOLAR AFFECTIVE DISORDER, REMISSION STATUS UNSPECIFIED (MULTI): ICD-10-CM

## 2025-08-13 DIAGNOSIS — F90.0 ATTENTION DEFICIT HYPERACTIVITY DISORDER (ADHD), PREDOMINANTLY INATTENTIVE TYPE: ICD-10-CM

## 2025-08-13 DIAGNOSIS — F41.1 GAD (GENERALIZED ANXIETY DISORDER): ICD-10-CM

## 2025-08-13 DIAGNOSIS — F22 PARANOIA (MULTI): ICD-10-CM

## 2025-08-13 DIAGNOSIS — G47.9 SLEEP DISTURBANCE: ICD-10-CM

## 2025-08-13 DIAGNOSIS — F33.2 SEVERE EPISODE OF RECURRENT MAJOR DEPRESSIVE DISORDER, WITHOUT PSYCHOTIC FEATURES (MULTI): ICD-10-CM

## 2025-08-13 PROCEDURE — 99214 OFFICE O/P EST MOD 30 MIN: CPT

## 2025-08-13 RX ORDER — GUANFACINE 1 MG/1
1 TABLET, EXTENDED RELEASE ORAL DAILY
Qty: 90 TABLET | Refills: 1 | Status: SHIPPED | OUTPATIENT
Start: 2025-08-13 | End: 2026-02-09

## 2025-08-13 RX ORDER — VENLAFAXINE HYDROCHLORIDE 75 MG/1
75 CAPSULE, EXTENDED RELEASE ORAL DAILY
Qty: 90 CAPSULE | Refills: 1 | Status: SHIPPED | OUTPATIENT
Start: 2025-08-13 | End: 2026-02-09

## 2025-09-22 ENCOUNTER — APPOINTMENT (OUTPATIENT)
Dept: BEHAVIORAL HEALTH | Facility: CLINIC | Age: 20
End: 2025-09-22
Payer: COMMERCIAL

## 2025-10-08 ENCOUNTER — APPOINTMENT (OUTPATIENT)
Dept: BEHAVIORAL HEALTH | Facility: CLINIC | Age: 20
End: 2025-10-08
Payer: COMMERCIAL